# Patient Record
Sex: FEMALE | Race: WHITE | NOT HISPANIC OR LATINO
[De-identification: names, ages, dates, MRNs, and addresses within clinical notes are randomized per-mention and may not be internally consistent; named-entity substitution may affect disease eponyms.]

---

## 2017-11-07 RX ORDER — CHOLECALCIFEROL (VITAMIN D3) 125 MCG
2 CAPSULE ORAL
Qty: 0 | Refills: 0 | COMMUNITY

## 2017-11-07 RX ORDER — ESOMEPRAZOLE MAGNESIUM 40 MG/1
1 CAPSULE, DELAYED RELEASE ORAL
Qty: 0 | Refills: 0 | COMMUNITY

## 2017-11-07 NOTE — PATIENT PROFILE ADULT. - PSH
H/O discectomy  lumbar 9/2012  H/O laminectomy    H/O pilonidal cyst  removed  History of hip replacement, total, right  2014  History of tonsillectomy

## 2017-11-07 NOTE — PATIENT PROFILE ADULT. - PMH
Asthma    Diverticulitis    GERD (gastroesophageal reflux disease)    HLD (hyperlipidemia)    HTN (hypertension)    OA (osteoarthritis)    Osteoporosis

## 2017-11-08 ENCOUNTER — TRANSCRIPTION ENCOUNTER (OUTPATIENT)
Age: 72
End: 2017-11-08

## 2017-11-08 ENCOUNTER — RESULT REVIEW (OUTPATIENT)
Age: 72
End: 2017-11-08

## 2017-11-08 ENCOUNTER — INPATIENT (INPATIENT)
Facility: HOSPITAL | Age: 72
LOS: 1 days | Discharge: HOME HEALTH | DRG: 470 | End: 2017-11-10
Payer: MEDICARE

## 2017-11-08 VITALS
OXYGEN SATURATION: 98 % | SYSTOLIC BLOOD PRESSURE: 186 MMHG | WEIGHT: 175.05 LBS | HEIGHT: 66 IN | DIASTOLIC BLOOD PRESSURE: 74 MMHG | TEMPERATURE: 98 F

## 2017-11-08 DIAGNOSIS — Z98.890 OTHER SPECIFIED POSTPROCEDURAL STATES: Chronic | ICD-10-CM

## 2017-11-08 DIAGNOSIS — M19.90 UNSPECIFIED OSTEOARTHRITIS, UNSPECIFIED SITE: ICD-10-CM

## 2017-11-08 DIAGNOSIS — Z90.89 ACQUIRED ABSENCE OF OTHER ORGANS: Chronic | ICD-10-CM

## 2017-11-08 DIAGNOSIS — Z87.2 PERSONAL HISTORY OF DISEASES OF THE SKIN AND SUBCUTANEOUS TISSUE: Chronic | ICD-10-CM

## 2017-11-08 DIAGNOSIS — Z96.641 PRESENCE OF RIGHT ARTIFICIAL HIP JOINT: Chronic | ICD-10-CM

## 2017-11-08 LAB
MRSA PCR RESULT.: NEGATIVE — SIGNIFICANT CHANGE UP
S AUREUS DNA NOSE QL NAA+PROBE: NEGATIVE — SIGNIFICANT CHANGE UP

## 2017-11-08 RX ORDER — ACETAMINOPHEN 500 MG
975 TABLET ORAL EVERY 8 HOURS
Qty: 0 | Refills: 0 | Status: DISCONTINUED | OUTPATIENT
Start: 2017-11-08 | End: 2017-11-10

## 2017-11-08 RX ORDER — ATORVASTATIN CALCIUM 80 MG/1
20 TABLET, FILM COATED ORAL AT BEDTIME
Qty: 0 | Refills: 0 | Status: DISCONTINUED | OUTPATIENT
Start: 2017-11-08 | End: 2017-11-10

## 2017-11-08 RX ORDER — PANTOPRAZOLE SODIUM 20 MG/1
40 TABLET, DELAYED RELEASE ORAL DAILY
Qty: 0 | Refills: 0 | Status: DISCONTINUED | OUTPATIENT
Start: 2017-11-08 | End: 2017-11-10

## 2017-11-08 RX ORDER — SODIUM CHLORIDE 9 MG/ML
1000 INJECTION, SOLUTION INTRAVENOUS
Qty: 0 | Refills: 0 | Status: DISCONTINUED | OUTPATIENT
Start: 2017-11-08 | End: 2017-11-09

## 2017-11-08 RX ORDER — HYDROCHLOROTHIAZIDE 25 MG
25 TABLET ORAL DAILY
Qty: 0 | Refills: 0 | Status: DISCONTINUED | OUTPATIENT
Start: 2017-11-08 | End: 2017-11-10

## 2017-11-08 RX ORDER — ACETAMINOPHEN 500 MG
650 TABLET ORAL EVERY 6 HOURS
Qty: 0 | Refills: 0 | Status: DISCONTINUED | OUTPATIENT
Start: 2017-11-08 | End: 2017-11-10

## 2017-11-08 RX ORDER — BUPIVACAINE 13.3 MG/ML
20 INJECTION, SUSPENSION, LIPOSOMAL INFILTRATION ONCE
Qty: 0 | Refills: 0 | Status: DISCONTINUED | OUTPATIENT
Start: 2017-11-08 | End: 2017-11-10

## 2017-11-08 RX ORDER — ZALEPLON 10 MG
5 CAPSULE ORAL AT BEDTIME
Qty: 0 | Refills: 0 | Status: DISCONTINUED | OUTPATIENT
Start: 2017-11-08 | End: 2017-11-10

## 2017-11-08 RX ORDER — MAGNESIUM HYDROXIDE 400 MG/1
30 TABLET, CHEWABLE ORAL
Qty: 0 | Refills: 0 | Status: DISCONTINUED | OUTPATIENT
Start: 2017-11-08 | End: 2017-11-10

## 2017-11-08 RX ORDER — CEFAZOLIN SODIUM 1 G
2000 VIAL (EA) INJECTION EVERY 8 HOURS
Qty: 0 | Refills: 0 | Status: COMPLETED | OUTPATIENT
Start: 2017-11-08 | End: 2017-11-09

## 2017-11-08 RX ORDER — ONDANSETRON 8 MG/1
4 TABLET, FILM COATED ORAL EVERY 6 HOURS
Qty: 0 | Refills: 0 | Status: DISCONTINUED | OUTPATIENT
Start: 2017-11-08 | End: 2017-11-10

## 2017-11-08 RX ORDER — DOCUSATE SODIUM 100 MG
100 CAPSULE ORAL THREE TIMES A DAY
Qty: 0 | Refills: 0 | Status: DISCONTINUED | OUTPATIENT
Start: 2017-11-08 | End: 2017-11-10

## 2017-11-08 RX ORDER — FAMOTIDINE 10 MG/ML
20 INJECTION INTRAVENOUS EVERY 12 HOURS
Qty: 0 | Refills: 0 | Status: DISCONTINUED | OUTPATIENT
Start: 2017-11-08 | End: 2017-11-10

## 2017-11-08 RX ORDER — POLYETHYLENE GLYCOL 3350 17 G/17G
17 POWDER, FOR SOLUTION ORAL DAILY
Qty: 0 | Refills: 0 | Status: DISCONTINUED | OUTPATIENT
Start: 2017-11-08 | End: 2017-11-10

## 2017-11-08 RX ORDER — MORPHINE SULFATE 50 MG/1
2 CAPSULE, EXTENDED RELEASE ORAL
Qty: 0 | Refills: 0 | Status: DISCONTINUED | OUTPATIENT
Start: 2017-11-08 | End: 2017-11-10

## 2017-11-08 RX ORDER — ASPIRIN/CALCIUM CARB/MAGNESIUM 324 MG
325 TABLET ORAL
Qty: 0 | Refills: 0 | Status: DISCONTINUED | OUTPATIENT
Start: 2017-11-08 | End: 2017-11-10

## 2017-11-08 RX ORDER — SENNA PLUS 8.6 MG/1
2 TABLET ORAL AT BEDTIME
Qty: 0 | Refills: 0 | Status: DISCONTINUED | OUTPATIENT
Start: 2017-11-08 | End: 2017-11-10

## 2017-11-08 RX ORDER — OXYCODONE HYDROCHLORIDE 5 MG/1
5 TABLET ORAL EVERY 4 HOURS
Qty: 0 | Refills: 0 | Status: DISCONTINUED | OUTPATIENT
Start: 2017-11-08 | End: 2017-11-10

## 2017-11-08 RX ORDER — HYDROMORPHONE HYDROCHLORIDE 2 MG/ML
0.5 INJECTION INTRAMUSCULAR; INTRAVENOUS; SUBCUTANEOUS
Qty: 0 | Refills: 0 | Status: DISCONTINUED | OUTPATIENT
Start: 2017-11-08 | End: 2017-11-10

## 2017-11-08 RX ORDER — OXYCODONE HYDROCHLORIDE 5 MG/1
10 TABLET ORAL EVERY 4 HOURS
Qty: 0 | Refills: 0 | Status: DISCONTINUED | OUTPATIENT
Start: 2017-11-08 | End: 2017-11-10

## 2017-11-08 RX ADMIN — Medication 100 MILLIGRAM(S): at 21:09

## 2017-11-08 RX ADMIN — Medication 100 MILLIGRAM(S): at 21:48

## 2017-11-08 RX ADMIN — Medication 975 MILLIGRAM(S): at 22:46

## 2017-11-08 RX ADMIN — ATORVASTATIN CALCIUM 20 MILLIGRAM(S): 80 TABLET, FILM COATED ORAL at 21:46

## 2017-11-08 RX ADMIN — Medication 975 MILLIGRAM(S): at 21:46

## 2017-11-08 RX ADMIN — SENNA PLUS 2 TABLET(S): 8.6 TABLET ORAL at 21:48

## 2017-11-08 NOTE — PHYSICAL THERAPY INITIAL EVALUATION ADULT - PERTINENT HX OF CURRENT PROBLEM, REHAB EVAL
Patient is a 72F c/o Left hip pain worsened over time without improvement. States fell in driveway 1 week ago causing Left hip fracture. Denies numbness, tingling, head trauma, LOC, other injuries. Ambulating with crutches. Presents today for elective Left THR with Dr. Murillo

## 2017-11-08 NOTE — DISCHARGE NOTE ADULT - PATIENT PORTAL LINK FT
“You can access the [employer] Patient Portal, offered by [facility], by registering with the following website: [email or web address]”

## 2017-11-08 NOTE — H&P ADULT - NSHPLABSRESULTS_GEN_ALL_CORE
Preop cbc, bmp (K 3.3), pt/inr, ptt wnl; nasal swab dos  +UA (asymptomatic) repeat dos  preop cxr wnl per clearance  preop ekg sinus vincent per clearance

## 2017-11-08 NOTE — DISCHARGE NOTE ADULT - MEDICATION SUMMARY - MEDICATIONS TO TAKE
I will START or STAY ON the medications listed below when I get home from the hospital:    aspirin 325 mg oral delayed release tablet  -- 1 tab(s) by mouth 2 times a day  -- Indication: For Dvt ppx    oxyCODONE 5 mg oral tablet  -- 1 tab(s) by mouth every 4 hours, As needed, Moderate Pain (4 - 6)  -- Indication: For pain med    oxyCODONE 10 mg oral tablet  -- 1 tab(s) by mouth every 4 hours, As needed, Severe Pain (7 - 10)  -- Indication: For pain med    Lipitor 20 mg oral tablet  -- 1 tab(s) by mouth once a day  -- Indication: For HLD (hyperlipidemia)    hydroCHLOROthiazide 25 mg oral tablet  -- 1 tab(s) by mouth once a day  -- Indication: For HTN (hypertension)    senna oral tablet  -- 2 tab(s) by mouth once a day (at bedtime), As needed, Constipation  -- Indication: For laxative    docusate sodium 100 mg oral capsule  -- 1 cap(s) by mouth 3 times a day  -- Indication: For laxative    NexIUM OTC 20 mg oral delayed release capsule  -- 1 cap(s) by mouth once a day  -- Indication: For GERD (gastroesophageal reflux disease) I will START or STAY ON the medications listed below when I get home from the hospital:    aspirin 325 mg oral delayed release tablet  -- 1 tab(s) by mouth 2 times a day  Take for 4 weeks after surgery to prevent blood clots.  OVER THE COUNTER MEDICINE  -- Indication: For DVT Prophylaxis     oxyCODONE-acetaminophen 5 mg-325 mg oral tablet  -- 1-2 tab(s) by mouth every 6 hours, As Needed -for severe pain MDD:10  -- Caution federal law prohibits the transfer of this drug to any person other  than the person for whom it was prescribed.  May cause drowsiness.  Alcohol may intensify this effect.  Use care when operating dangerous machinery.  This prescription cannot be refilled.  This product contains acetaminophen.  Do not use  with any other product containing acetaminophen to prevent possible liver damage.  Using more of this medication than prescribed may cause serious breathing problems.    -- Indication: For Severe Pain    meloxicam 15 mg oral tablet  -- 1 tab(s) by mouth once a day   -- Do not take this drug if you are pregnant.  Obtain medical advice before taking any non-prescription drugs as some may affect the action of this medication.  Take with food or milk.    -- Indication: For Anti inflammatory     Lipitor 20 mg oral tablet  -- 1 tab(s) by mouth once a day  -- Indication: For HLD (hyperlipidemia)    hydroCHLOROthiazide 25 mg oral tablet  -- 1 tab(s) by mouth once a day  -- Indication: For HTN (hypertension)    senna oral tablet  -- 2 tab(s) by mouth once a day (at bedtime), As needed, Constipation  -- Indication: For laxative    docusate sodium 100 mg oral capsule  -- 1 cap(s) by mouth 3 times a day  -- Indication: For laxative    bisacodyl 10 mg rectal suppository  -- 1 suppository(ies) rectally once a day, As needed, If no bowel movement by POD#2  -- Indication: For suppository    polyethylene glycol 3350 oral powder for reconstitution  -- 17 gram(s) by mouth once a day  -- Indication: For laxative    NexIUM OTC 20 mg oral delayed release capsule  -- 1 cap(s) by mouth once a day  -- Indication: For GERD (gastroesophageal reflux disease)    calcium (as carbonate)-vitamin D 250 mg-125 intl units oral tablet  -- 1 tab(s) by mouth once a day  -- Indication: For Vitamin

## 2017-11-08 NOTE — DISCHARGE NOTE ADULT - NS AS ACTIVITY OBS
Walking-Indoors allowed/Do not make important decisions/No Heavy lifting/straining/Walking-Outdoors allowed/Stairs allowed/Do not drive or operate machinery/Showering allowed

## 2017-11-08 NOTE — PHYSICAL THERAPY INITIAL EVALUATION ADULT - GENERAL OBSERVATIONS, REHAB EVAL
Received semi-supine in bed with +tele, +pulse ox, on 2LPM O2 via NC, +SCDs, dressing c/d/i, +ice pack to L hip x 2, in no apparent distress. Patient denies pain at rest

## 2017-11-08 NOTE — DISCHARGE NOTE ADULT - CARE PROVIDER_API CALL
Saw Murillo), Orthopaedic Surgery  [address]  [address] [place] [address]  Phone: [phone]  Fax: [phone]

## 2017-11-08 NOTE — PHYSICAL THERAPY INITIAL EVALUATION ADULT - CRITERIA FOR SKILLED THERAPEUTIC INTERVENTIONS
risk reduction/prevention/impairments found/functional limitations in following categories/predicted duration of therapy intervention/rehab potential/anticipated equipment needs at discharge/therapy frequency/anticipated discharge recommendation

## 2017-11-08 NOTE — H&P ADULT - HISTORY OF PRESENT ILLNESS
72F c/o Left hip pain worsened over time without improvement. States fell in driveway 1 week ago causing Left hip fracture. Denies numbness, tingling, head trauma, LOC, other injuries. Ambulating with crutches. Presents today for elective Left THR.

## 2017-11-08 NOTE — DISCHARGE NOTE ADULT - HOSPITAL COURSE
Admitted  Surgery  Ramonita-op Antibiotics  Pain control  DVT prophylaxis  OOB/Physical Therapy Admitted 11/8/17  Surgery 11/8/17- L LUCIANA  Ramonita-op Antibiotics  Pain control  DVT prophylaxis  OOB/Physical Therapy

## 2017-11-08 NOTE — PHYSICAL THERAPY INITIAL EVALUATION ADULT - ADDITIONAL COMMENTS
Patient lives with her  in a private house with 4 steps with unilateral handrail to enter. Prior to admission, patient was ambulating with crutches s/p fall in driveway and needed assistance from her  for ADLs.

## 2017-11-08 NOTE — DISCHARGE NOTE ADULT - ADDITIONAL INSTRUCTIONS
No strenuous activity, heavy lifting, driving or returning to work until cleared by MD.  You may shower - dressing is water-resistant, no soaking in bathtubs.  Remove dressing after post op day 5-7, then leave incision open to air. Keep incision clean and dry.  Try to have regular bowel movements, take stool softener or laxative if necessary.  May take Pepcid or Zantac for upset stomach.  May take Aleve or Naproxen instead of Meloxicam/Celebrex.  Swelling may travel all the way down leg to foot, this is normal and will subside in a few weeks.  Call to schedule an appt with Dr. Murillo for follow up, if you have staples or sutures they will be removed in office.  Contact your doctor if you experience: fever greater than 101.5, chills, chest pain, difficulty breathing, redness or excessive drainage around the incision, other concerns.  Follow up with your primary care provider. Weight bearing as tolerated on left leg with rolling walker.  No hip precautions.  No strenuous activity, heavy lifting, driving or returning to work until cleared by MD.  You may shower - dressing is water-resistant, no soaking in bathtubs.  Remove dressing after post op day 7, then leave incision open to air. Keep incision clean and dry.  Try to have regular bowel movements, take stool softener or laxative if necessary.  May take Pepcid or Zantac for upset stomach.  May take Aleve or Naproxen instead of Meloxicam/Celebrex.  Swelling may travel all the way down leg to foot, this is normal and will subside in a few weeks.  Call to schedule an appt with Dr. Murillo for follow up, if you have staples or sutures they will be removed in office.  Contact your doctor if you experience: fever greater than 101.5, chills, chest pain, difficulty breathing, redness or excessive drainage around the incision, other concerns.  Follow up with your primary care provider.

## 2017-11-08 NOTE — PHYSICAL THERAPY INITIAL EVALUATION ADULT - DIAGNOSIS, PT EVAL
Practice Pattern 4H: Impaired joint mobility, motor function, muscle performance and range of motion associated with joint arthroplasty

## 2017-11-08 NOTE — DISCHARGE NOTE ADULT - CARE PLAN
Principal Discharge DX:	OA (osteoarthritis)  Goal:	improvements s/p Left THR  Instructions for follow-up, activity and diet:	see below Principal Discharge DX:	OA (osteoarthritis)  Goal:	improvements s/p Left LUCIANA  Instructions for follow-up, activity and diet:	see below

## 2017-11-08 NOTE — PHYSICAL THERAPY INITIAL EVALUATION ADULT - IMPAIRMENTS FOUND, PT EVAL
gait, locomotion, and balance/joint integrity and mobility/muscle strength/aerobic capacity/endurance

## 2017-11-08 NOTE — PHYSICAL THERAPY INITIAL EVALUATION ADULT - PLANNED THERAPY INTERVENTIONS, PT EVAL
gait training/manual therapy techniques/bed mobility training/balance training/ROM/strengthening/transfer training

## 2017-11-08 NOTE — DISCHARGE NOTE ADULT - MEDICATION SUMMARY - MEDICATIONS TO STOP TAKING
I will STOP taking the medications listed below when I get home from the hospital:    Percocet 5/325 oral tablet  -- 1 tab(s) by mouth every 6 hours, As Needed I will STOP taking the medications listed below when I get home from the hospital:  None

## 2017-11-09 LAB
ANION GAP SERPL CALC-SCNC: 12 MMOL/L — SIGNIFICANT CHANGE UP (ref 5–17)
BUN SERPL-MCNC: 23 MG/DL — SIGNIFICANT CHANGE UP (ref 7–23)
CALCIUM SERPL-MCNC: 9.4 MG/DL — SIGNIFICANT CHANGE UP (ref 8.4–10.5)
CHLORIDE SERPL-SCNC: 94 MMOL/L — LOW (ref 96–108)
CO2 SERPL-SCNC: 30 MMOL/L — SIGNIFICANT CHANGE UP (ref 22–31)
CREAT SERPL-MCNC: 0.83 MG/DL — SIGNIFICANT CHANGE UP (ref 0.5–1.3)
GLUCOSE SERPL-MCNC: 116 MG/DL — HIGH (ref 70–99)
HCT VFR BLD CALC: 33.3 % — LOW (ref 34.5–45)
HGB BLD-MCNC: 10.8 G/DL — LOW (ref 11.5–15.5)
MCHC RBC-ENTMCNC: 29.1 PG — SIGNIFICANT CHANGE UP (ref 27–34)
MCHC RBC-ENTMCNC: 32.4 G/DL — SIGNIFICANT CHANGE UP (ref 32–36)
MCV RBC AUTO: 89.8 FL — SIGNIFICANT CHANGE UP (ref 80–100)
PLATELET # BLD AUTO: 332 K/UL — SIGNIFICANT CHANGE UP (ref 150–400)
POTASSIUM SERPL-MCNC: 3.6 MMOL/L — SIGNIFICANT CHANGE UP (ref 3.5–5.3)
POTASSIUM SERPL-SCNC: 3.6 MMOL/L — SIGNIFICANT CHANGE UP (ref 3.5–5.3)
RBC # BLD: 3.71 M/UL — LOW (ref 3.8–5.2)
RBC # FLD: 14.2 % — SIGNIFICANT CHANGE UP (ref 10.3–16.9)
SODIUM SERPL-SCNC: 136 MMOL/L — SIGNIFICANT CHANGE UP (ref 135–145)
WBC # BLD: 10.9 K/UL — HIGH (ref 3.8–10.5)
WBC # FLD AUTO: 10.9 K/UL — HIGH (ref 3.8–10.5)

## 2017-11-09 RX ORDER — OXYCODONE HYDROCHLORIDE 5 MG/1
1 TABLET ORAL
Qty: 0 | Refills: 0 | COMMUNITY
Start: 2017-11-09

## 2017-11-09 RX ORDER — POLYETHYLENE GLYCOL 3350 17 G/17G
17 POWDER, FOR SOLUTION ORAL
Qty: 0 | Refills: 0 | COMMUNITY
Start: 2017-11-09

## 2017-11-09 RX ORDER — DOCUSATE SODIUM 100 MG
1 CAPSULE ORAL
Qty: 0 | Refills: 0 | COMMUNITY
Start: 2017-11-09

## 2017-11-09 RX ORDER — ASPIRIN/CALCIUM CARB/MAGNESIUM 324 MG
1 TABLET ORAL
Qty: 0 | Refills: 0 | COMMUNITY
Start: 2017-11-09

## 2017-11-09 RX ORDER — KETOROLAC TROMETHAMINE 30 MG/ML
15 SYRINGE (ML) INJECTION EVERY 6 HOURS
Qty: 0 | Refills: 0 | Status: COMPLETED | OUTPATIENT
Start: 2017-11-09 | End: 2017-11-11

## 2017-11-09 RX ORDER — SENNA PLUS 8.6 MG/1
2 TABLET ORAL
Qty: 0 | Refills: 0 | COMMUNITY
Start: 2017-11-09

## 2017-11-09 RX ADMIN — OXYCODONE HYDROCHLORIDE 5 MILLIGRAM(S): 5 TABLET ORAL at 08:31

## 2017-11-09 RX ADMIN — Medication 325 MILLIGRAM(S): at 05:02

## 2017-11-09 RX ADMIN — Medication 325 MILLIGRAM(S): at 17:28

## 2017-11-09 RX ADMIN — Medication 975 MILLIGRAM(S): at 22:36

## 2017-11-09 RX ADMIN — Medication 100 MILLIGRAM(S): at 22:35

## 2017-11-09 RX ADMIN — OXYCODONE HYDROCHLORIDE 10 MILLIGRAM(S): 5 TABLET ORAL at 11:57

## 2017-11-09 RX ADMIN — ONDANSETRON 4 MILLIGRAM(S): 8 TABLET, FILM COATED ORAL at 14:32

## 2017-11-09 RX ADMIN — Medication 975 MILLIGRAM(S): at 05:00

## 2017-11-09 RX ADMIN — Medication 975 MILLIGRAM(S): at 06:00

## 2017-11-09 RX ADMIN — Medication 100 MILLIGRAM(S): at 05:01

## 2017-11-09 RX ADMIN — Medication 25 MILLIGRAM(S): at 05:02

## 2017-11-09 RX ADMIN — Medication 15 MILLIGRAM(S): at 16:15

## 2017-11-09 RX ADMIN — Medication 100 MILLIGRAM(S): at 05:06

## 2017-11-09 RX ADMIN — Medication 975 MILLIGRAM(S): at 13:28

## 2017-11-09 RX ADMIN — Medication 1 TABLET(S): at 11:56

## 2017-11-09 RX ADMIN — FAMOTIDINE 20 MILLIGRAM(S): 10 INJECTION INTRAVENOUS at 05:02

## 2017-11-09 RX ADMIN — POLYETHYLENE GLYCOL 3350 17 GRAM(S): 17 POWDER, FOR SOLUTION ORAL at 13:28

## 2017-11-09 RX ADMIN — Medication 100 MILLIGRAM(S): at 13:28

## 2017-11-09 RX ADMIN — Medication 15 MILLIGRAM(S): at 16:31

## 2017-11-09 RX ADMIN — OXYCODONE HYDROCHLORIDE 10 MILLIGRAM(S): 5 TABLET ORAL at 12:57

## 2017-11-09 RX ADMIN — PANTOPRAZOLE SODIUM 40 MILLIGRAM(S): 20 TABLET, DELAYED RELEASE ORAL at 07:11

## 2017-11-09 RX ADMIN — Medication 975 MILLIGRAM(S): at 23:25

## 2017-11-09 RX ADMIN — FAMOTIDINE 20 MILLIGRAM(S): 10 INJECTION INTRAVENOUS at 17:28

## 2017-11-09 RX ADMIN — OXYCODONE HYDROCHLORIDE 5 MILLIGRAM(S): 5 TABLET ORAL at 07:58

## 2017-11-09 RX ADMIN — ATORVASTATIN CALCIUM 20 MILLIGRAM(S): 80 TABLET, FILM COATED ORAL at 22:34

## 2017-11-09 RX ADMIN — Medication 975 MILLIGRAM(S): at 14:23

## 2017-11-09 NOTE — PROVIDER CONTACT NOTE (OTHER) - BACKGROUND
S/P THR POD 1. 2nd PT trail. Pt. recently medicated with OXy- pt took this before and tolerates it fine.
Pt had a left total hip replacement

## 2017-11-10 VITALS
HEART RATE: 56 BPM | DIASTOLIC BLOOD PRESSURE: 60 MMHG | OXYGEN SATURATION: 97 % | RESPIRATION RATE: 15 BRPM | TEMPERATURE: 97 F | SYSTOLIC BLOOD PRESSURE: 137 MMHG

## 2017-11-10 LAB
ANION GAP SERPL CALC-SCNC: 12 MMOL/L — SIGNIFICANT CHANGE UP (ref 5–17)
BUN SERPL-MCNC: 22 MG/DL — SIGNIFICANT CHANGE UP (ref 7–23)
CALCIUM SERPL-MCNC: 9.5 MG/DL — SIGNIFICANT CHANGE UP (ref 8.4–10.5)
CHLORIDE SERPL-SCNC: 91 MMOL/L — LOW (ref 96–108)
CO2 SERPL-SCNC: 29 MMOL/L — SIGNIFICANT CHANGE UP (ref 22–31)
CREAT SERPL-MCNC: 0.96 MG/DL — SIGNIFICANT CHANGE UP (ref 0.5–1.3)
GLUCOSE SERPL-MCNC: 103 MG/DL — HIGH (ref 70–99)
HCT VFR BLD CALC: 29.5 % — LOW (ref 34.5–45)
HGB BLD-MCNC: 10 G/DL — LOW (ref 11.5–15.5)
MCHC RBC-ENTMCNC: 30.3 PG — SIGNIFICANT CHANGE UP (ref 27–34)
MCHC RBC-ENTMCNC: 33.9 G/DL — SIGNIFICANT CHANGE UP (ref 32–36)
MCV RBC AUTO: 89.4 FL — SIGNIFICANT CHANGE UP (ref 80–100)
PLATELET # BLD AUTO: 289 K/UL — SIGNIFICANT CHANGE UP (ref 150–400)
POTASSIUM SERPL-MCNC: 3.1 MMOL/L — LOW (ref 3.5–5.3)
POTASSIUM SERPL-SCNC: 3.1 MMOL/L — LOW (ref 3.5–5.3)
RBC # BLD: 3.3 M/UL — LOW (ref 3.8–5.2)
RBC # FLD: 14.8 % — SIGNIFICANT CHANGE UP (ref 10.3–16.9)
SODIUM SERPL-SCNC: 132 MMOL/L — LOW (ref 135–145)
SURGICAL PATHOLOGY STUDY: SIGNIFICANT CHANGE UP
WBC # BLD: 9.1 K/UL — SIGNIFICANT CHANGE UP (ref 3.8–10.5)
WBC # FLD AUTO: 9.1 K/UL — SIGNIFICANT CHANGE UP (ref 3.8–10.5)

## 2017-11-10 RX ORDER — MELOXICAM 15 MG/1
1 TABLET ORAL
Qty: 30 | Refills: 0 | OUTPATIENT
Start: 2017-11-10 | End: 2017-12-10

## 2017-11-10 RX ORDER — POTASSIUM CHLORIDE 20 MEQ
40 PACKET (EA) ORAL ONCE
Qty: 0 | Refills: 0 | Status: COMPLETED | OUTPATIENT
Start: 2017-11-10 | End: 2017-11-10

## 2017-11-10 RX ADMIN — Medication 15 MILLIGRAM(S): at 00:50

## 2017-11-10 RX ADMIN — POLYETHYLENE GLYCOL 3350 17 GRAM(S): 17 POWDER, FOR SOLUTION ORAL at 12:10

## 2017-11-10 RX ADMIN — Medication 975 MILLIGRAM(S): at 07:10

## 2017-11-10 RX ADMIN — Medication 40 MILLIEQUIVALENT(S): at 12:10

## 2017-11-10 RX ADMIN — Medication 325 MILLIGRAM(S): at 16:43

## 2017-11-10 RX ADMIN — FAMOTIDINE 20 MILLIGRAM(S): 10 INJECTION INTRAVENOUS at 06:15

## 2017-11-10 RX ADMIN — Medication 15 MILLIGRAM(S): at 00:26

## 2017-11-10 RX ADMIN — Medication 15 MILLIGRAM(S): at 07:10

## 2017-11-10 RX ADMIN — Medication 975 MILLIGRAM(S): at 13:45

## 2017-11-10 RX ADMIN — Medication 25 MILLIGRAM(S): at 06:15

## 2017-11-10 RX ADMIN — Medication 15 MILLIGRAM(S): at 06:13

## 2017-11-10 RX ADMIN — Medication 15 MILLIGRAM(S): at 16:43

## 2017-11-10 RX ADMIN — Medication 100 MILLIGRAM(S): at 06:14

## 2017-11-10 RX ADMIN — Medication 100 MILLIGRAM(S): at 12:10

## 2017-11-10 RX ADMIN — Medication 325 MILLIGRAM(S): at 06:14

## 2017-11-10 RX ADMIN — Medication 975 MILLIGRAM(S): at 06:14

## 2017-11-10 RX ADMIN — PANTOPRAZOLE SODIUM 40 MILLIGRAM(S): 20 TABLET, DELAYED RELEASE ORAL at 06:15

## 2017-11-10 RX ADMIN — Medication 975 MILLIGRAM(S): at 14:45

## 2017-11-10 RX ADMIN — Medication 15 MILLIGRAM(S): at 12:25

## 2017-11-10 RX ADMIN — Medication 15 MILLIGRAM(S): at 12:10

## 2017-11-10 NOTE — PROGRESS NOTE ADULT - SUBJECTIVE AND OBJECTIVE BOX
Ortho Addendum:    Patient seen bedside. Pain well controlled. Wants to be discharged home today. States did well with PT. No complaints.  No CP, palpitations, or SOB  vss  LEFT LE: Dressing c/d/i  thigh/calf soft,,compressible  ehl, tib ant, GS 5/5  gross sensation intact LE  foot warm, DP palpable    K= 3.1    A/P: s/p Left LUCIANA POD#2  Plan for discharge home today  Slight hypokalemia, K+ repleted with K-Leigh 40 mEq
Did well o/n.     Procedure: L LUCIANA ant  Surgeon: Dr Murillo    Pt comfortable, without complaints  Denies CP, SOB, N/V, numbness/tingling     Vital Signs Last 24 Hrs  T(C): 36.7 (10 Nov 2017 05:25), Max: 37.2 (09 Nov 2017 08:35)  T(F): 98 (10 Nov 2017 05:25), Max: 99 (09 Nov 2017 08:35)  HR: 67 (10 Nov 2017 05:25) (56 - 69)  BP: 112/70 (10 Nov 2017 05:25) (112/70 - 168/77)  BP(mean): --  RR: 14 (10 Nov 2017 05:25) (14 - 16)  SpO2: 97% (10 Nov 2017 01:20) (96% - 97%)    Dressing C/D/I  General: Pt Alert and oriented   wwp  Sensation: silt  Motor: 5/5 GS/TA/EHL/FHL      A/P: 72yFemale POD#2 s/p above  - Stable  - Pain Control  - DVT ppx  - Post op abx  - PT, WBS: wbat  - F/U AM Labs
Orthopaedics Post Op Check    Procedure: L LUCIANA ant  Surgeon: Dr Murillo    Pt comfortable, without complaints  Denies CP, SOB, N/V, numbness/tingling     Vital Signs Last 24 Hrs  T(C): 36.7 (09 Nov 2017 04:52), Max: 37.1 (08 Nov 2017 20:30)  T(F): 98.1 (09 Nov 2017 04:52), Max: 98.8 (08 Nov 2017 20:30)  HR: 55 (09 Nov 2017 04:52) (36 - 74)  BP: 123/75 (09 Nov 2017 04:52) (112/72 - 186/74)  BP(mean): 100 (08 Nov 2017 21:00) (84 - 107)  RR: 16 (09 Nov 2017 04:52) (6 - 21)  SpO2: 96% (09 Nov 2017 04:52) (87% - 100%)  AVSS, NAD    Dressing C/D/I  General: Pt Alert and oriented   wwp  Sensation: silt  Motor: 5/5 GS/TA/EHL/FHL      A/P: 72yFemale POD#0 s/p above  - Stable  - Pain Control  - DVT ppx  - Post op abx  - PT, WBS: wbat  - F/U AM Labs

## 2017-11-17 DIAGNOSIS — R73.03 PREDIABETES: ICD-10-CM
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
## 2017-11-17 DIAGNOSIS — Z79.899 OTHER LONG TERM (CURRENT) DRUG THERAPY: ICD-10-CM

## 2017-11-17 DIAGNOSIS — K21.9 GASTRO-ESOPHAGEAL REFLUX DISEASE WITHOUT ESOPHAGITIS: ICD-10-CM

## 2017-11-17 DIAGNOSIS — J45.20 MILD INTERMITTENT ASTHMA, UNCOMPLICATED: ICD-10-CM

## 2017-11-17 DIAGNOSIS — S72.002A FRACTURE OF UNSPECIFIED PART OF NECK OF LEFT FEMUR, INITIAL ENCOUNTER FOR CLOSED FRACTURE: ICD-10-CM

## 2017-11-17 DIAGNOSIS — Z96.641 PRESENCE OF RIGHT ARTIFICIAL HIP JOINT: ICD-10-CM

## 2017-11-17 DIAGNOSIS — Z91.09 OTHER ALLERGY STATUS, OTHER THAN TO DRUGS AND BIOLOGICAL SUBSTANCES: ICD-10-CM

## 2017-11-17 DIAGNOSIS — I10 ESSENTIAL (PRIMARY) HYPERTENSION: ICD-10-CM

## 2017-11-17 DIAGNOSIS — Z28.21 IMMUNIZATION NOT CARRIED OUT BECAUSE OF PATIENT REFUSAL: ICD-10-CM
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
## 2017-11-17 DIAGNOSIS — Z88.8 ALLERGY STATUS TO OTHER DRUGS, MEDICAMENTS AND BIOLOGICAL SUBSTANCES: ICD-10-CM

## 2017-11-17 DIAGNOSIS — M16.12 UNILATERAL PRIMARY OSTEOARTHRITIS, LEFT HIP: ICD-10-CM

## 2017-11-17 DIAGNOSIS — E87.6 HYPOKALEMIA: ICD-10-CM

## 2017-11-17 DIAGNOSIS — E78.5 HYPERLIPIDEMIA, UNSPECIFIED: ICD-10-CM

## 2017-11-17 DIAGNOSIS — Z79.891 LONG TERM (CURRENT) USE OF OPIATE ANALGESIC: ICD-10-CM

## 2017-11-17 DIAGNOSIS — Z87.891 PERSONAL HISTORY OF NICOTINE DEPENDENCE: ICD-10-CM

## 2017-11-17 DIAGNOSIS — Z86.010 PERSONAL HISTORY OF COLONIC POLYPS: ICD-10-CM

## 2017-11-17 DIAGNOSIS — E21.3 HYPERPARATHYROIDISM, UNSPECIFIED: ICD-10-CM

## 2017-11-17 DIAGNOSIS — M81.0 AGE-RELATED OSTEOPOROSIS WITHOUT CURRENT PATHOLOGICAL FRACTURE: ICD-10-CM

## 2017-11-17 DIAGNOSIS — G47.00 INSOMNIA, UNSPECIFIED: ICD-10-CM

## 2017-12-19 ENCOUNTER — INPATIENT (INPATIENT)
Facility: HOSPITAL | Age: 72
LOS: 2 days | Discharge: HOME HEALTH | DRG: 467 | End: 2017-12-22
Attending: ORTHOPAEDIC SURGERY | Admitting: ORTHOPAEDIC SURGERY
Payer: MEDICARE

## 2017-12-19 VITALS
SYSTOLIC BLOOD PRESSURE: 164 MMHG | HEART RATE: 69 BPM | OXYGEN SATURATION: 98 % | TEMPERATURE: 98 F | RESPIRATION RATE: 18 BRPM | DIASTOLIC BLOOD PRESSURE: 86 MMHG

## 2017-12-19 DIAGNOSIS — Z98.890 OTHER SPECIFIED POSTPROCEDURAL STATES: Chronic | ICD-10-CM

## 2017-12-19 DIAGNOSIS — Z87.2 PERSONAL HISTORY OF DISEASES OF THE SKIN AND SUBCUTANEOUS TISSUE: Chronic | ICD-10-CM

## 2017-12-19 DIAGNOSIS — Z96.641 PRESENCE OF RIGHT ARTIFICIAL HIP JOINT: Chronic | ICD-10-CM

## 2017-12-19 DIAGNOSIS — Z90.89 ACQUIRED ABSENCE OF OTHER ORGANS: Chronic | ICD-10-CM

## 2017-12-19 LAB
ALBUMIN SERPL ELPH-MCNC: 4.3 G/DL — SIGNIFICANT CHANGE UP (ref 3.3–5)
ALP SERPL-CCNC: 114 U/L — SIGNIFICANT CHANGE UP (ref 40–120)
ALT FLD-CCNC: 11 U/L — SIGNIFICANT CHANGE UP (ref 10–45)
ANION GAP SERPL CALC-SCNC: 17 MMOL/L — SIGNIFICANT CHANGE UP (ref 5–17)
APTT BLD: 26.1 SEC — LOW (ref 27.5–37.4)
AST SERPL-CCNC: 16 U/L — SIGNIFICANT CHANGE UP (ref 10–40)
BASOPHILS NFR BLD AUTO: 0.1 % — SIGNIFICANT CHANGE UP (ref 0–2)
BILIRUB SERPL-MCNC: 0.2 MG/DL — SIGNIFICANT CHANGE UP (ref 0.2–1.2)
BLD GP AB SCN SERPL QL: NEGATIVE — SIGNIFICANT CHANGE UP
BUN SERPL-MCNC: 22 MG/DL — SIGNIFICANT CHANGE UP (ref 7–23)
CALCIUM SERPL-MCNC: 10.4 MG/DL — SIGNIFICANT CHANGE UP (ref 8.4–10.5)
CHLORIDE SERPL-SCNC: 95 MMOL/L — LOW (ref 96–108)
CO2 SERPL-SCNC: 26 MMOL/L — SIGNIFICANT CHANGE UP (ref 22–31)
CREAT SERPL-MCNC: 0.78 MG/DL — SIGNIFICANT CHANGE UP (ref 0.5–1.3)
EOSINOPHIL NFR BLD AUTO: 0.2 % — SIGNIFICANT CHANGE UP (ref 0–6)
GLUCOSE SERPL-MCNC: 125 MG/DL — HIGH (ref 70–99)
HCT VFR BLD CALC: 37.7 % — SIGNIFICANT CHANGE UP (ref 34.5–45)
HGB BLD-MCNC: 12.1 G/DL — SIGNIFICANT CHANGE UP (ref 11.5–15.5)
INR BLD: 0.98 — SIGNIFICANT CHANGE UP (ref 0.88–1.16)
LYMPHOCYTES # BLD AUTO: 9.2 % — LOW (ref 13–44)
MCHC RBC-ENTMCNC: 29.1 PG — SIGNIFICANT CHANGE UP (ref 27–34)
MCHC RBC-ENTMCNC: 32.1 G/DL — SIGNIFICANT CHANGE UP (ref 32–36)
MCV RBC AUTO: 90.6 FL — SIGNIFICANT CHANGE UP (ref 80–100)
MONOCYTES NFR BLD AUTO: 3.2 % — SIGNIFICANT CHANGE UP (ref 2–14)
NEUTROPHILS NFR BLD AUTO: 87.3 % — HIGH (ref 43–77)
PLATELET # BLD AUTO: 464 K/UL — HIGH (ref 150–400)
POTASSIUM SERPL-MCNC: 3.2 MMOL/L — LOW (ref 3.5–5.3)
POTASSIUM SERPL-SCNC: 3.2 MMOL/L — LOW (ref 3.5–5.3)
PROT SERPL-MCNC: 7.2 G/DL — SIGNIFICANT CHANGE UP (ref 6–8.3)
PROTHROM AB SERPL-ACNC: 10.9 SEC — SIGNIFICANT CHANGE UP (ref 9.8–12.7)
RBC # BLD: 4.16 M/UL — SIGNIFICANT CHANGE UP (ref 3.8–5.2)
RBC # FLD: 14.9 % — SIGNIFICANT CHANGE UP (ref 10.3–16.9)
RH IG SCN BLD-IMP: POSITIVE — SIGNIFICANT CHANGE UP
SODIUM SERPL-SCNC: 138 MMOL/L — SIGNIFICANT CHANGE UP (ref 135–145)
WBC # BLD: 10.8 K/UL — HIGH (ref 3.8–10.5)
WBC # FLD AUTO: 10.8 K/UL — HIGH (ref 3.8–10.5)

## 2017-12-19 PROCEDURE — 88300 SURGICAL PATH GROSS: CPT

## 2017-12-19 PROCEDURE — 71010: CPT | Mod: 26

## 2017-12-19 PROCEDURE — 73700 CT LOWER EXTREMITY W/O DYE: CPT | Mod: 26,LT

## 2017-12-19 PROCEDURE — 86901 BLOOD TYPING SEROLOGIC RH(D): CPT

## 2017-12-19 PROCEDURE — 97161 PT EVAL LOW COMPLEX 20 MIN: CPT

## 2017-12-19 PROCEDURE — 76000 FLUOROSCOPY <1 HR PHYS/QHP: CPT

## 2017-12-19 PROCEDURE — C1713: CPT

## 2017-12-19 PROCEDURE — 73501 X-RAY EXAM HIP UNI 1 VIEW: CPT | Mod: 26,LT

## 2017-12-19 PROCEDURE — 73502 X-RAY EXAM HIP UNI 2-3 VIEWS: CPT | Mod: 26,LT

## 2017-12-19 PROCEDURE — 80048 BASIC METABOLIC PNL TOTAL CA: CPT

## 2017-12-19 PROCEDURE — 93010 ELECTROCARDIOGRAM REPORT: CPT

## 2017-12-19 PROCEDURE — 85027 COMPLETE CBC AUTOMATED: CPT

## 2017-12-19 PROCEDURE — C1776: CPT

## 2017-12-19 PROCEDURE — 36415 COLL VENOUS BLD VENIPUNCTURE: CPT

## 2017-12-19 PROCEDURE — 86900 BLOOD TYPING SEROLOGIC ABO: CPT

## 2017-12-19 PROCEDURE — 86850 RBC ANTIBODY SCREEN: CPT

## 2017-12-19 PROCEDURE — 87641 MR-STAPH DNA AMP PROBE: CPT

## 2017-12-19 PROCEDURE — 73502 X-RAY EXAM HIP UNI 2-3 VIEWS: CPT | Mod: 26

## 2017-12-19 PROCEDURE — 97116 GAIT TRAINING THERAPY: CPT

## 2017-12-19 PROCEDURE — 99285 EMERGENCY DEPT VISIT HI MDM: CPT | Mod: 25

## 2017-12-19 RX ORDER — ONDANSETRON 8 MG/1
4 TABLET, FILM COATED ORAL EVERY 4 HOURS
Qty: 0 | Refills: 0 | Status: DISCONTINUED | OUTPATIENT
Start: 2017-12-19 | End: 2017-12-22

## 2017-12-19 RX ORDER — PROPOFOL 10 MG/ML
140 INJECTION, EMULSION INTRAVENOUS ONCE
Qty: 0 | Refills: 0 | Status: DISCONTINUED | OUTPATIENT
Start: 2017-12-19 | End: 2017-12-19

## 2017-12-19 RX ORDER — METOCLOPRAMIDE HCL 10 MG
10 TABLET ORAL EVERY 6 HOURS
Qty: 0 | Refills: 0 | Status: DISCONTINUED | OUTPATIENT
Start: 2017-12-19 | End: 2017-12-22

## 2017-12-19 RX ORDER — MORPHINE SULFATE 50 MG/1
4 CAPSULE, EXTENDED RELEASE ORAL ONCE
Qty: 0 | Refills: 0 | Status: DISCONTINUED | OUTPATIENT
Start: 2017-12-19 | End: 2017-12-19

## 2017-12-19 RX ORDER — ASPIRIN/CALCIUM CARB/MAGNESIUM 324 MG
325 TABLET ORAL
Qty: 0 | Refills: 0 | Status: DISCONTINUED | OUTPATIENT
Start: 2017-12-19 | End: 2017-12-20

## 2017-12-19 RX ORDER — HYDROCHLOROTHIAZIDE 25 MG
0 TABLET ORAL
Qty: 0 | Refills: 0 | COMMUNITY

## 2017-12-19 RX ORDER — HYDROMORPHONE HYDROCHLORIDE 2 MG/ML
1 INJECTION INTRAMUSCULAR; INTRAVENOUS; SUBCUTANEOUS ONCE
Qty: 0 | Refills: 0 | Status: DISCONTINUED | OUTPATIENT
Start: 2017-12-19 | End: 2017-12-19

## 2017-12-19 RX ORDER — OXYCODONE HYDROCHLORIDE 5 MG/1
5 TABLET ORAL EVERY 4 HOURS
Qty: 0 | Refills: 0 | Status: DISCONTINUED | OUTPATIENT
Start: 2017-12-19 | End: 2017-12-20

## 2017-12-19 RX ORDER — PANTOPRAZOLE SODIUM 20 MG/1
40 TABLET, DELAYED RELEASE ORAL
Qty: 0 | Refills: 0 | Status: DISCONTINUED | OUTPATIENT
Start: 2017-12-19 | End: 2017-12-22

## 2017-12-19 RX ORDER — OXYCODONE HYDROCHLORIDE 5 MG/1
10 TABLET ORAL EVERY 4 HOURS
Qty: 0 | Refills: 0 | Status: DISCONTINUED | OUTPATIENT
Start: 2017-12-19 | End: 2017-12-20

## 2017-12-19 RX ORDER — DOCUSATE SODIUM 100 MG
100 CAPSULE ORAL THREE TIMES A DAY
Qty: 0 | Refills: 0 | Status: DISCONTINUED | OUTPATIENT
Start: 2017-12-19 | End: 2017-12-21

## 2017-12-19 RX ORDER — HYDROCHLOROTHIAZIDE 25 MG
25 TABLET ORAL DAILY
Qty: 0 | Refills: 0 | Status: DISCONTINUED | OUTPATIENT
Start: 2017-12-19 | End: 2017-12-22

## 2017-12-19 RX ORDER — MORPHINE SULFATE 50 MG/1
4 CAPSULE, EXTENDED RELEASE ORAL EVERY 4 HOURS
Qty: 0 | Refills: 0 | Status: DISCONTINUED | OUTPATIENT
Start: 2017-12-19 | End: 2017-12-20

## 2017-12-19 RX ORDER — PROPOFOL 10 MG/ML
110 INJECTION, EMULSION INTRAVENOUS ONCE
Qty: 0 | Refills: 0 | Status: COMPLETED | OUTPATIENT
Start: 2017-12-19 | End: 2017-12-19

## 2017-12-19 RX ORDER — ATORVASTATIN CALCIUM 80 MG/1
20 TABLET, FILM COATED ORAL AT BEDTIME
Qty: 0 | Refills: 0 | Status: DISCONTINUED | OUTPATIENT
Start: 2017-12-19 | End: 2017-12-22

## 2017-12-19 RX ORDER — ACETAMINOPHEN 500 MG
650 TABLET ORAL EVERY 6 HOURS
Qty: 0 | Refills: 0 | Status: DISCONTINUED | OUTPATIENT
Start: 2017-12-19 | End: 2017-12-22

## 2017-12-19 RX ORDER — SODIUM CHLORIDE 9 MG/ML
1000 INJECTION, SOLUTION INTRAVENOUS
Qty: 0 | Refills: 0 | Status: DISCONTINUED | OUTPATIENT
Start: 2017-12-19 | End: 2017-12-21

## 2017-12-19 RX ORDER — HEPARIN SODIUM 5000 [USP'U]/ML
5000 INJECTION INTRAVENOUS; SUBCUTANEOUS EVERY 8 HOURS
Qty: 0 | Refills: 0 | Status: DISCONTINUED | OUTPATIENT
Start: 2017-12-19 | End: 2017-12-19

## 2017-12-19 RX ORDER — MAGNESIUM HYDROXIDE 400 MG/1
30 TABLET, CHEWABLE ORAL DAILY
Qty: 0 | Refills: 0 | Status: DISCONTINUED | OUTPATIENT
Start: 2017-12-19 | End: 2017-12-21

## 2017-12-19 RX ORDER — ACETAMINOPHEN 500 MG
650 TABLET ORAL EVERY 6 HOURS
Qty: 0 | Refills: 0 | Status: DISCONTINUED | OUTPATIENT
Start: 2017-12-19 | End: 2017-12-21

## 2017-12-19 RX ORDER — ONDANSETRON 8 MG/1
4 TABLET, FILM COATED ORAL ONCE
Qty: 0 | Refills: 0 | Status: COMPLETED | OUTPATIENT
Start: 2017-12-19 | End: 2017-12-19

## 2017-12-19 RX ADMIN — MORPHINE SULFATE 4 MILLIGRAM(S): 50 CAPSULE, EXTENDED RELEASE ORAL at 19:45

## 2017-12-19 RX ADMIN — HYDROMORPHONE HYDROCHLORIDE 1 MILLIGRAM(S): 2 INJECTION INTRAMUSCULAR; INTRAVENOUS; SUBCUTANEOUS at 19:48

## 2017-12-19 RX ADMIN — HYDROMORPHONE HYDROCHLORIDE 1 MILLIGRAM(S): 2 INJECTION INTRAMUSCULAR; INTRAVENOUS; SUBCUTANEOUS at 23:13

## 2017-12-19 RX ADMIN — MORPHINE SULFATE 4 MILLIGRAM(S): 50 CAPSULE, EXTENDED RELEASE ORAL at 19:15

## 2017-12-19 RX ADMIN — OXYCODONE HYDROCHLORIDE 5 MILLIGRAM(S): 5 TABLET ORAL at 23:49

## 2017-12-19 RX ADMIN — ONDANSETRON 4 MILLIGRAM(S): 8 TABLET, FILM COATED ORAL at 19:15

## 2017-12-19 RX ADMIN — PROPOFOL 110 MILLIGRAM(S): 10 INJECTION, EMULSION INTRAVENOUS at 20:50

## 2017-12-19 NOTE — H&P ADULT - HISTORY OF PRESENT ILLNESS
Patient is 72 F with R LUCIANA 11/8/17. Come in after she felt a click in her L hip and has been having pain and unable to bear weight.

## 2017-12-19 NOTE — ED PROVIDER NOTE - OBJECTIVE STATEMENT
72F with concern for L hip pain concern for dislocation. Pt had hip replacement 6 weeks prior, bent down to  cat and felt L hip pop out. No head injury no syncope.

## 2017-12-19 NOTE — H&P ADULT - ASSESSMENT
1. Admit to Ortho  2. Regular Diet  3.  mg BID  4. Pain meds  5. WBAT in KI  6. CT Scan of the L hip

## 2017-12-19 NOTE — H&P ADULT - NSHPPHYSICALEXAM_GEN_ALL_CORE
Pre reduction:    WWP, BCR  DP/PT  Motor: EHL/FHL/GS/AT  Sensory: DP/SP/AT, MP/LP/S/S SILT  ROM: Limited ROM at L hip due to pain  Deformity: Slight ER with shortening      Post Reduction:    WWP, BCR  DP/PT  Motor: EHL/FHL/GS/AT  Sensory: DP/SP/AT, MP/LP/S/S SILT  ROM: Painless ROM, but patient placed in KI  Deformity: None

## 2017-12-19 NOTE — ED ADULT NURSE NOTE - OBJECTIVE STATEMENT
Pt states " I had left hip replacement done on 11/8/17 and today I was at the vet and my cat jumped to the floor. I bend to pick him up and dislocated my hip. I am in so much pain".

## 2017-12-19 NOTE — ED ADULT NURSE REASSESSMENT NOTE - NS ED NURSE REASSESS COMMENT FT1
Vital signs on Conscious sedation sheet. Vital signs on Conscious sedation sheet.n Propofol IV a total of 110 mg given during the procedure. Vital signs on Conscious sedation sheet.  Capnography from 41 - 45.  Propofol IV a total of 110 mg given during the procedure. Vital signs on Conscious sedation sheet.  Capnography from 41 - 45 mmHg..  Propofol IV a total of 110 mg given during the procedure.

## 2017-12-19 NOTE — H&P ADULT - ATTENDING COMMENTS
Pt was seen/ examined  Closed reduction attempted in ED 12/19/17  X-ray demonstrated incomplete reduction  Pt. Admitted./ CT ordered    Today, pt. seen/ examined  Discussed that CT is suspicious for poly failure  Discussed low probability of success with closed reduction   Discussed risks/ benefits/ alternatives of open reduction in detail   Will proceed to OR

## 2017-12-19 NOTE — ED PROVIDER NOTE - MEDICAL DECISION MAKING DETAILS
72F with concern for L hip pain, L hip dislocation. Vitals notable for HTN, pt is uncomfortable due to pain. Pt reduced under moderate sedation with propofol, no complications noted. 1 hour prior to reduction given Dilaudid and Morphine without pain relief. Pain relief achieved with reduction only. Will admit for further pain control.

## 2017-12-19 NOTE — ED PROVIDER NOTE - ENMT, MLM
Airway patent, Nasal mucosa clear. oropharynx clear 2+ dp/pt pulse, sensation intact to light touch l4/l5/s1, concern for pain with rom of l hip

## 2017-12-19 NOTE — H&P ADULT - NSHPLABSRESULTS_GEN_ALL_CORE
CBC Full  -  ( 19 Dec 2017 19:25 )  WBC Count : 10.8 K/uL  Hemoglobin : 12.1 g/dL  Hematocrit : 37.7 %  Platelet Count - Automated : 464 K/uL  Mean Cell Volume : 90.6 fL  Mean Cell Hemoglobin : 29.1 pg  Mean Cell Hemoglobin Concentration : 32.1 g/dL  Auto Neutrophil # : x  Auto Lymphocyte # : x  Auto Monocyte # : x  Auto Eosinophil # : x  Auto Basophil # : x  Auto Neutrophil % : 87.3 %  Auto Lymphocyte % : 9.2 %  Auto Monocyte % : 3.2 %  Auto Eosinophil % : 0.2 %  Auto Basophil % : 0.1 %      12-19    138  |  95<L>  |  22  ----------------------------<  125<H>  3.2<L>   |  26  |  0.78    Ca    10.4      19 Dec 2017 19:25    TPro  7.2  /  Alb  4.3  /  TBili  0.2  /  DBili  x   /  AST  16  /  ALT  11  /  AlkPhos  114  12-19

## 2017-12-20 DIAGNOSIS — Z01.818 ENCOUNTER FOR OTHER PREPROCEDURAL EXAMINATION: ICD-10-CM

## 2017-12-20 PROBLEM — J45.909 UNSPECIFIED ASTHMA, UNCOMPLICATED: Chronic | Status: ACTIVE | Noted: 2017-11-07

## 2017-12-20 PROBLEM — M19.90 UNSPECIFIED OSTEOARTHRITIS, UNSPECIFIED SITE: Chronic | Status: ACTIVE | Noted: 2017-11-07

## 2017-12-20 PROBLEM — I10 ESSENTIAL (PRIMARY) HYPERTENSION: Chronic | Status: ACTIVE | Noted: 2017-11-07

## 2017-12-20 PROBLEM — K21.9 GASTRO-ESOPHAGEAL REFLUX DISEASE WITHOUT ESOPHAGITIS: Chronic | Status: ACTIVE | Noted: 2017-11-07

## 2017-12-20 PROBLEM — M81.0 AGE-RELATED OSTEOPOROSIS WITHOUT CURRENT PATHOLOGICAL FRACTURE: Chronic | Status: ACTIVE | Noted: 2017-11-07

## 2017-12-20 PROBLEM — E78.5 HYPERLIPIDEMIA, UNSPECIFIED: Chronic | Status: ACTIVE | Noted: 2017-11-07

## 2017-12-20 PROBLEM — K57.92 DIVERTICULITIS OF INTESTINE, PART UNSPECIFIED, WITHOUT PERFORATION OR ABSCESS WITHOUT BLEEDING: Chronic | Status: ACTIVE | Noted: 2017-11-07

## 2017-12-20 LAB
ANION GAP SERPL CALC-SCNC: 11 MMOL/L — SIGNIFICANT CHANGE UP (ref 5–17)
APPEARANCE UR: CLEAR — SIGNIFICANT CHANGE UP
BACTERIA # UR AUTO: PRESENT /HPF
BILIRUB UR-MCNC: NEGATIVE — SIGNIFICANT CHANGE UP
BLD GP AB SCN SERPL QL: NEGATIVE — SIGNIFICANT CHANGE UP
BUN SERPL-MCNC: 15 MG/DL — SIGNIFICANT CHANGE UP (ref 7–23)
CALCIUM SERPL-MCNC: 9.8 MG/DL — SIGNIFICANT CHANGE UP (ref 8.4–10.5)
CHLORIDE SERPL-SCNC: 97 MMOL/L — SIGNIFICANT CHANGE UP (ref 96–108)
CO2 SERPL-SCNC: 30 MMOL/L — SIGNIFICANT CHANGE UP (ref 22–31)
COLOR SPEC: YELLOW — SIGNIFICANT CHANGE UP
COMMENT - URINE: SIGNIFICANT CHANGE UP
CREAT SERPL-MCNC: 0.75 MG/DL — SIGNIFICANT CHANGE UP (ref 0.5–1.3)
DIFF PNL FLD: NEGATIVE — SIGNIFICANT CHANGE UP
EPI CELLS # UR: (no result) /HPF (ref 0–5)
GLUCOSE SERPL-MCNC: 102 MG/DL — HIGH (ref 70–99)
GLUCOSE UR QL: NEGATIVE — SIGNIFICANT CHANGE UP
HCT VFR BLD CALC: 34.8 % — SIGNIFICANT CHANGE UP (ref 34.5–45)
HGB BLD-MCNC: 11.1 G/DL — LOW (ref 11.5–15.5)
KETONES UR-MCNC: (no result) MG/DL
LEUKOCYTE ESTERASE UR-ACNC: (no result)
MCHC RBC-ENTMCNC: 29.1 PG — SIGNIFICANT CHANGE UP (ref 27–34)
MCHC RBC-ENTMCNC: 31.9 G/DL — LOW (ref 32–36)
MCV RBC AUTO: 91.1 FL — SIGNIFICANT CHANGE UP (ref 80–100)
NITRITE UR-MCNC: NEGATIVE — SIGNIFICANT CHANGE UP
PH UR: 6 — SIGNIFICANT CHANGE UP (ref 5–8)
PLATELET # BLD AUTO: 389 K/UL — SIGNIFICANT CHANGE UP (ref 150–400)
POTASSIUM SERPL-MCNC: 4.1 MMOL/L — SIGNIFICANT CHANGE UP (ref 3.5–5.3)
POTASSIUM SERPL-SCNC: 4.1 MMOL/L — SIGNIFICANT CHANGE UP (ref 3.5–5.3)
PROT UR-MCNC: NEGATIVE MG/DL — SIGNIFICANT CHANGE UP
RBC # BLD: 3.82 M/UL — SIGNIFICANT CHANGE UP (ref 3.8–5.2)
RBC # FLD: 15.3 % — SIGNIFICANT CHANGE UP (ref 10.3–16.9)
RBC CASTS # UR COMP ASSIST: < 5 /HPF — SIGNIFICANT CHANGE UP
RH IG SCN BLD-IMP: POSITIVE — SIGNIFICANT CHANGE UP
SODIUM SERPL-SCNC: 138 MMOL/L — SIGNIFICANT CHANGE UP (ref 135–145)
SP GR SPEC: 1.02 — SIGNIFICANT CHANGE UP (ref 1–1.03)
UROBILINOGEN FLD QL: 0.2 E.U./DL — SIGNIFICANT CHANGE UP
WBC # BLD: 7.7 K/UL — SIGNIFICANT CHANGE UP (ref 3.8–10.5)
WBC # FLD AUTO: 7.7 K/UL — SIGNIFICANT CHANGE UP (ref 3.8–10.5)
WBC UR QL: < 5 /HPF — SIGNIFICANT CHANGE UP

## 2017-12-20 PROCEDURE — 93010 ELECTROCARDIOGRAM REPORT: CPT

## 2017-12-20 PROCEDURE — 99221 1ST HOSP IP/OBS SF/LOW 40: CPT | Mod: GC

## 2017-12-20 RX ORDER — ASPIRIN/CALCIUM CARB/MAGNESIUM 324 MG
325 TABLET ORAL
Qty: 0 | Refills: 0 | Status: DISCONTINUED | OUTPATIENT
Start: 2017-12-21 | End: 2017-12-22

## 2017-12-20 RX ORDER — OXYCODONE HYDROCHLORIDE 5 MG/1
5 TABLET ORAL EVERY 4 HOURS
Qty: 0 | Refills: 0 | Status: DISCONTINUED | OUTPATIENT
Start: 2017-12-21 | End: 2017-12-22

## 2017-12-20 RX ORDER — MORPHINE SULFATE 50 MG/1
2 CAPSULE, EXTENDED RELEASE ORAL ONCE
Qty: 0 | Refills: 0 | Status: DISCONTINUED | OUTPATIENT
Start: 2017-12-21 | End: 2017-12-22

## 2017-12-20 RX ORDER — SODIUM CHLORIDE 9 MG/ML
1000 INJECTION, SOLUTION INTRAVENOUS
Qty: 0 | Refills: 0 | Status: DISCONTINUED | OUTPATIENT
Start: 2017-12-21 | End: 2017-12-22

## 2017-12-20 RX ORDER — MORPHINE SULFATE 50 MG/1
2 CAPSULE, EXTENDED RELEASE ORAL EVERY 4 HOURS
Qty: 0 | Refills: 0 | Status: DISCONTINUED | OUTPATIENT
Start: 2017-12-21 | End: 2017-12-22

## 2017-12-20 RX ORDER — MAGNESIUM HYDROXIDE 400 MG/1
30 TABLET, CHEWABLE ORAL DAILY
Qty: 0 | Refills: 0 | Status: DISCONTINUED | OUTPATIENT
Start: 2017-12-21 | End: 2017-12-22

## 2017-12-20 RX ORDER — ACETAMINOPHEN 500 MG
650 TABLET ORAL EVERY 6 HOURS
Qty: 0 | Refills: 0 | Status: DISCONTINUED | OUTPATIENT
Start: 2017-12-21 | End: 2017-12-22

## 2017-12-20 RX ORDER — OXYCODONE HYDROCHLORIDE 5 MG/1
10 TABLET ORAL EVERY 4 HOURS
Qty: 0 | Refills: 0 | Status: DISCONTINUED | OUTPATIENT
Start: 2017-12-21 | End: 2017-12-22

## 2017-12-20 RX ORDER — CEFAZOLIN SODIUM 1 G
2000 VIAL (EA) INJECTION EVERY 8 HOURS
Qty: 0 | Refills: 0 | Status: DISCONTINUED | OUTPATIENT
Start: 2017-12-21 | End: 2017-12-22

## 2017-12-20 RX ORDER — POLYETHYLENE GLYCOL 3350 17 G/17G
17 POWDER, FOR SOLUTION ORAL DAILY
Qty: 0 | Refills: 0 | Status: DISCONTINUED | OUTPATIENT
Start: 2017-12-21 | End: 2017-12-22

## 2017-12-20 RX ORDER — DOCUSATE SODIUM 100 MG
100 CAPSULE ORAL THREE TIMES A DAY
Qty: 0 | Refills: 0 | Status: DISCONTINUED | OUTPATIENT
Start: 2017-12-21 | End: 2017-12-22

## 2017-12-20 RX ORDER — SENNA PLUS 8.6 MG/1
2 TABLET ORAL AT BEDTIME
Qty: 0 | Refills: 0 | Status: DISCONTINUED | OUTPATIENT
Start: 2017-12-21 | End: 2017-12-22

## 2017-12-20 RX ADMIN — SODIUM CHLORIDE 100 MILLILITER(S): 9 INJECTION, SOLUTION INTRAVENOUS at 00:00

## 2017-12-20 RX ADMIN — OXYCODONE HYDROCHLORIDE 5 MILLIGRAM(S): 5 TABLET ORAL at 00:49

## 2017-12-20 RX ADMIN — PANTOPRAZOLE SODIUM 40 MILLIGRAM(S): 20 TABLET, DELAYED RELEASE ORAL at 05:39

## 2017-12-20 RX ADMIN — SODIUM CHLORIDE 100 MILLILITER(S): 9 INJECTION, SOLUTION INTRAVENOUS at 06:40

## 2017-12-20 RX ADMIN — MORPHINE SULFATE 4 MILLIGRAM(S): 50 CAPSULE, EXTENDED RELEASE ORAL at 19:19

## 2017-12-20 RX ADMIN — MORPHINE SULFATE 4 MILLIGRAM(S): 50 CAPSULE, EXTENDED RELEASE ORAL at 19:34

## 2017-12-20 NOTE — PROGRESS NOTE ADULT - SUBJECTIVE AND OBJECTIVE BOX
Ortho Preop Note    Patient is a 72y old  Female who presents with a chief complaint of L Hip pain and LLE shortening and internal rotation (19 Dec 2017 23:22)    Diagnosis: L LUCIANA closed manipulation versus open reduction  Procedure: L LUCIANA Closed manipulation versus open reduction  Surgeon: Amalia                          12.1   10.8  )-----------( 464      ( 19 Dec 2017 19:25 )             37.7     12-19    138  |  95<L>  |  22  ----------------------------<  125<H>  3.2<L>   |  26  |  0.78    Ca    10.4      19 Dec 2017 19:25    TPro  7.2  /  Alb  4.3  /  TBili  0.2  /  DBili  x   /  AST  16  /  ALT  11  /  AlkPhos  114  12-19    PT/INR - ( 19 Dec 2017 19:25 )   PT: 10.9 sec;   INR: 0.98          PTT - ( 19 Dec 2017 19:25 )  PTT:26.1 sec      [x ] Type & Screen  [x ] CBC  [x ] BMP  [x ] PT/PTT/INR  [x ] Urinalysis  [x ] Chest X-ray  [x ] EKG  [x ] NPO/IVF  [x ] Consent  [ ] Clearance  [x ] Added on to OR Schedule  [x ] Anti-coagulation held  [ ] MRSA/MSSA Nasal Screen     Assessment & Plan:  72yFemale with L LUCIANA Dislocation with non concentric closed reduction  -For OR 12/20/17    Ortho Pager [phone] Ortho Preop Note    Patient is a 72y old  Female who presents with a chief complaint of L Hip pain and LLE shortening and internal rotation (19 Dec 2017 23:22)    Diagnosis: L LUCIANA closed manipulation versus open reduction  Procedure: L LUCIANA Closed manipulation versus open reduction  Surgeon: Torey                          12.1   10.8  )-----------( 464      ( 19 Dec 2017 19:25 )             37.7     12-19    138  |  95<L>  |  22  ----------------------------<  125<H>  3.2<L>   |  26  |  0.78    Ca    10.4      19 Dec 2017 19:25    TPro  7.2  /  Alb  4.3  /  TBili  0.2  /  DBili  x   /  AST  16  /  ALT  11  /  AlkPhos  114  12-19    PT/INR - ( 19 Dec 2017 19:25 )   PT: 10.9 sec;   INR: 0.98          PTT - ( 19 Dec 2017 19:25 )  PTT:26.1 sec      [x ] Type & Screen  [x ] CBC  [x ] BMP  [x ] PT/PTT/INR  [x ] Urinalysis  [x ] Chest X-ray  [x ] EKG  [x ] NPO/IVF  [x ] Consent  [ ] Clearance  [x ] Added on to OR Schedule  [x ] Anti-coagulation held  [ ] MRSA/MSSA Nasal Screen     Assessment & Plan:  72yFemale with L LUCIANA Dislocation with non concentric closed reduction  -For OR 12/20/17    Ortho Pager [phone]

## 2017-12-20 NOTE — CONSULT NOTE ADULT - ASSESSMENT
A/P    73 yo s/p L THR in 11/8 cb complete posterosuperior dislocation, plan for OR for closed manipulation vs open reduction.  Medicine consulted for pre-op clearance.    Pre-Op assessment:  pt with no hx of problems with prior anesthesia, Mallampati class II  on no ASA, BB, ACE or ARBs  CBC/CMP/ALB/ECG and CXR unremarkable  Disease specific:  1, Cards: No Hx of IHD/HF/CAD/ hx of arrhythmias  based on RCRI score, risk of MACE <1%  2, Pulm: Non smoker, remote Hx of asthma asymptomatic for years, no LINDA/COPD or PULM HTN  based on Garsia score: risk or PRF: 0.2 %  3, ID: no active infection based on exam and labs  4, Renal: WNL  5, nutritional status: optimized  6, metabolic: no Hx of DM/thyroid, BG at goal A/P    73 yo s/p L THR in 11/8 cb complete posterosuperior dislocation, plan for OR for closed manipulation vs open reduction.  Medicine consulted for pre-op clearance.    Pre-Op assessment:  overall functional status:  HDS, independent of IADLs, >10 METs  pt with no hx of problems with prior anesthesia, Mallampati class II  on no ASA, BB, ACE or ARBs  CBC/CMP/ALB/ECG and CXR unremarkable  Disease specific:  1, Cards: No Hx of IHD/HF/CAD/ hx of arrhythmias  based on RCRI score, risk of MACE <1%  2, Pulm: Non smoker, remote Hx of asthma asymptomatic for years, no LINDA/COPD or PULM HTN  based on Garsia score: risk of PRF: 0.2 %  3, ID: no active infection based on exam and labs  4, Renal: WNL  5, nutritional status: optimized  6, metabolic: no Hx of DM/thyroid, BG at goal

## 2017-12-20 NOTE — CONSULT NOTE ADULT - ATTENDING COMMENTS
Pt seen and examined at bedside on 12/20 @ 5 am    Agree with HPI, ROS as above.     VS, Labs, FH, SH, allergies, medications, imaging reviewed. I personally reviewed the EKG - NSR. Agree with physical exam as above     A/P: 73 yo s/p L THR in 11/8 cb complete posterosuperior dislocation, plan for OR for closed manipulation vs open reduction, medicine consulted for preoperative clearance    **Preoperative clearance  -RCRI 0, Patient is able to perform 4 METS at baseline. Class I risk for moderate risk procedure  -Patient is medically optimized for surgery - would hold home medications day of procedure, can resume after  -Has previously tolerated orthopedic surgery (in November 2017) and general anesthesia without issue    Plan otherwise as outlined above....

## 2017-12-20 NOTE — CONSULT NOTE ADULT - SUBJECTIVE AND OBJECTIVE BOX
HPI:  71 yo F, s/p L JESSICA in 11/8, presenting with hip pain inability to perform weight bearing activities after feeling a click in her left hip, found to have posterosuperior dislocation of left total prosthesis,   close reduction attempted however on post procedure imaging there is a questioof soft tissue , muscle or labrum entrapment plan for OR on 12/20 for L LUCIANA Closed manipulation versus open reduction.     Past Medical History:  Asthma    Diverticulitis    GERD (gastroesophageal reflux disease)    HLD (hyperlipidemia)    HTN (hypertension)    OA (osteoarthritis)    Osteoporosis HPI:  73 yo F, s/p L JESSICA in 11/8, presenting with hip pain inability to perform weight bearing activities after feeling a click in her left hip, found to have posterosuperior dislocation of left total prosthesis,   close reduction attempted however on post procedure imaging there is a questioof soft tissue , muscle/labrum entrapment, plan for OR on 12/20 for L LUCIANA Closed manipulation versus open  reduction.     Past Medical History:  Remote Asthma, asymptomatic for years  Diverticulitis    GERD (gastroesophageal reflux disease)    HLD/HLD  OA (osteoarthritis)       Past Surgical History:  H/O discectomy  lumbar 9/2012  H/O laminectomy    H/O pilonidal cyst  removed  History of hip replacement, total, right  2014  History of tonsillectomy.    Allergies and Intolerances:        Allergies:  	No Known Drug Allergies:   	adhesives: Miscellaneous, Rash   Social History:  Non smoker, social drinking, denies illicit drug use      Home medications:  Nexium 20 mg ER  Lipitor 20 mg at bedtime  HCTZ 25 mg daily      ROS:  All other review of systems negative, except as noted in HPI      VITAL SIGNS:  T(C): 36.4 (12-19-17 @ 23:47), Max: 36.7 (12-19-17 @ 18:20)  T(F): 97.5 (12-19-17 @ 23:47), Max: 98 (12-19-17 @ 18:20)  HR: 77 (12-19-17 @ 23:47) (69 - 77)  BP: 131/85 (12-19-17 @ 23:47) (131/85 - 164/86)  BP(mean): --  RR: 16 (12-19-17 @ 23:47) (16 - 18)  SpO2: 95% (12-19-17 @ 23:47) (95% - 98%)  Wt(kg): --      PHYSICAL EXAM:  Constitutional: WDWN resting comfortably in bed; NAD  HEENT: NC/AT, PERRL, EOMI, mild nasal congestion present, no tonsillar exudate or hypertrophy, no skin lesions, MMM  Neck: supple; no JVD or thyromegaly  Respiratory: CTA B/L  Cardiac: +S1/S2; RRR; no M/R/G  Gastrointestinal: soft, NT/ND; no rebound or guarding; +BS  Back: spine midline, no bony tenderness or step-offs; no CVAT B/L, lower mid back surgical incision well healed   Extremities: WWP, no clubbing or cyanosis; no peripheral edema, left knee immobilizer present, pt ambulatory with walker in no pain, left anterior groin incision well healed without drainage or erythema  Vascular: 2+ radial and DP pulses B/L  Lymphatic: no submandibular or cervical LAD  Neurologic: AAOx3; CNII-XII grossly intact; no focal deficits, gait stable, ambulating with walker, UE DTRs intact, LE DTRs not assessed   Psychiatric: affect and characteristics of appearance, verbalizations, behaviors are appropriate    T/L/D: PIVs c/d/i      Labs and Imaging:     Mild leukocytosis at 10.8 with neutrophilic predominance and the absence of other infectious markers : likely in the setting of acute trauma and inflammation  mild thrombocytopenia at 460  hypokalemia at 3.2, likely in the setting of HCTZ and poor PO intake   Normal coags, BG and Kidnety function    ECG: NSR  CXR: No focal consolidation, pleural effusions or pneumothorax.  Pelvic X ray:  Complete posterosuperior dislocation of left total prosthesis  There is question cortical discontinuity of the left greater trochanter,   with an adjacent oblong soft tissue density, possibly a hematoma. Cannot   exclude a nondisplaced fracture in this area.  Negative for acute fracture through the bony pelvis. HPI:  73 yo F, s/p L JESSICA in 11/8, presenting with hip pain inability to perform weight bearing activities after feeling a click in her left hip, found to have posterosuperior dislocation of left total prosthesis,   close reduction attempted however on post procedure imaging there is a questioof soft tissue , muscle/labrum entrapment, plan for OR on 12/20 for L LUCIANA Closed manipulation versus open  reduction.     Past Medical History:  Remote Asthma, asymptomatic for years  Diverticulitis    GERD (gastroesophageal reflux disease)    HLD/HLD  OA (osteoarthritis)       Past Surgical History:  H/O discectomy  lumbar 9/2012  H/O laminectomy    H/O pilonidal cyst  removed  History of hip replacement, total, right  2014  History of tonsillectomy.    Allergies and Intolerances:        Allergies:  	No Known Drug Allergies:   	adhesives: Miscellaneous, Rash   Social History:  Non smoker, social drinking, denies illicit drug use      Home medications:  Nexium 20 mg ER  Lipitor 20 mg at bedtime  HCTZ 25 mg daily      ROS:  All other review of systems negative, except as noted in HPI      VITAL SIGNS:  T(C): 36.4 (12-19-17 @ 23:47), Max: 36.7 (12-19-17 @ 18:20)  T(F): 97.5 (12-19-17 @ 23:47), Max: 98 (12-19-17 @ 18:20)  HR: 77 (12-19-17 @ 23:47) (69 - 77)  BP: 131/85 (12-19-17 @ 23:47) (131/85 - 164/86)  BP(mean): --  RR: 16 (12-19-17 @ 23:47) (16 - 18)  SpO2: 95% (12-19-17 @ 23:47) (95% - 98%)  Wt(kg): --      PHYSICAL EXAM:  Constitutional: WDWN resting comfortably in bed; NAD  HEENT: NC/AT, PERRL, EOMI, mild nasal congestion present, no tonsillar exudate or hypertrophy, no skin lesions, MMM, no dentures   Neck: supple; no JVD or thyromegaly  Respiratory: CTA B/L  Cardiac: +S1/S2; RRR; no M/R/G  Gastrointestinal: soft, NT/ND; no rebound or guarding; +BS  Back: spine midline, no bony tenderness or step-offs; no CVAT B/L, lower mid back surgical incision well healed   Extremities: WWP, no clubbing or cyanosis; no peripheral edema, left knee immobilizer present, pt ambulatory with walker in no pain, left anterior groin incision well healed without drainage or erythema  Vascular: 2+ radial and DP pulses B/L  Lymphatic: no submandibular or cervical LAD  Neurologic: AAOx3; CNII-XII grossly intact; no focal deficits, gait stable, ambulating with walker, UE DTRs intact, LE DTRs not assessed   Psychiatric: affect and characteristics of appearance, verbalizations, behaviors are appropriate    T/L/D: PIVs c/d/i      Labs and Imaging:     Mild leukocytosis at 10.8 with neutrophilic predominance and the absence of other infectious markers : likely in the setting of acute trauma and inflammation  mild thrombocytopenia at 460  hypokalemia at 3.2, likely in the setting of HCTZ and poor PO intake   Normal coags, BG and Kidnety function    ECG: NSR  CXR: No focal consolidation, pleural effusions or pneumothorax.  Pelvic X ray:  Complete posterosuperior dislocation of left total prosthesis  There is question cortical discontinuity of the left greater trochanter,   with an adjacent oblong soft tissue density, possibly a hematoma. Cannot   exclude a nondisplaced fracture in this area.  Negative for acute fracture through the bony pelvis.

## 2017-12-20 NOTE — CONSULT NOTE ADULT - PROBLEM SELECTOR RECOMMENDATION 9
- Replete Lytes for K>4 and Mg>2  - proceed with surgery, no further evaluation required - Replete Lytes for K>4 and Mg>2  - Pre-op checklist  - proceed with surgery, no further evaluation or medical optimization required - Replete Lytes for K>4 and Mg>2  - Pre-op checklist  - proceed with surgery, no further evaluation or medical optimization required    to be discussed with Dr. Harper.

## 2017-12-20 NOTE — BRIEF OPERATIVE NOTE - PROCEDURE
<<-----Click on this checkbox to enter Procedure Revision of hip arthroplasty  12/20/2017    Active  [id number]

## 2017-12-21 ENCOUNTER — TRANSCRIPTION ENCOUNTER (OUTPATIENT)
Age: 72
End: 2017-12-21

## 2017-12-21 LAB
ANION GAP SERPL CALC-SCNC: 13 MMOL/L — SIGNIFICANT CHANGE UP (ref 5–17)
ANION GAP SERPL CALC-SCNC: 13 MMOL/L — SIGNIFICANT CHANGE UP (ref 5–17)
ANISOCYTOSIS BLD QL: SLIGHT — SIGNIFICANT CHANGE UP
BUN SERPL-MCNC: 15 MG/DL — SIGNIFICANT CHANGE UP (ref 7–23)
BUN SERPL-MCNC: 16 MG/DL — SIGNIFICANT CHANGE UP (ref 7–23)
CALCIUM SERPL-MCNC: 10.4 MG/DL — SIGNIFICANT CHANGE UP (ref 8.4–10.5)
CALCIUM SERPL-MCNC: 9.7 MG/DL — SIGNIFICANT CHANGE UP (ref 8.4–10.5)
CHLORIDE SERPL-SCNC: 95 MMOL/L — LOW (ref 96–108)
CHLORIDE SERPL-SCNC: 95 MMOL/L — LOW (ref 96–108)
CO2 SERPL-SCNC: 28 MMOL/L — SIGNIFICANT CHANGE UP (ref 22–31)
CO2 SERPL-SCNC: 28 MMOL/L — SIGNIFICANT CHANGE UP (ref 22–31)
CREAT SERPL-MCNC: 0.74 MG/DL — SIGNIFICANT CHANGE UP (ref 0.5–1.3)
CREAT SERPL-MCNC: 0.78 MG/DL — SIGNIFICANT CHANGE UP (ref 0.5–1.3)
GLUCOSE SERPL-MCNC: 169 MG/DL — HIGH (ref 70–99)
GLUCOSE SERPL-MCNC: 170 MG/DL — HIGH (ref 70–99)
GRAM STN FLD: SIGNIFICANT CHANGE UP
HCT VFR BLD CALC: 34.5 % — SIGNIFICANT CHANGE UP (ref 34.5–45)
HCT VFR BLD CALC: 34.6 % — SIGNIFICANT CHANGE UP (ref 34.5–45)
HGB BLD-MCNC: 11.1 G/DL — LOW (ref 11.5–15.5)
HGB BLD-MCNC: 11.2 G/DL — LOW (ref 11.5–15.5)
LG PLATELETS BLD QL AUTO: PRESENT — SIGNIFICANT CHANGE UP
LYMPHOCYTES # BLD AUTO: 5 % — LOW (ref 13–44)
MAGNESIUM SERPL-MCNC: 1.6 MG/DL — SIGNIFICANT CHANGE UP (ref 1.6–2.6)
MANUAL SMEAR VERIFICATION: SIGNIFICANT CHANGE UP
MCHC RBC-ENTMCNC: 29.4 PG — SIGNIFICANT CHANGE UP (ref 27–34)
MCHC RBC-ENTMCNC: 29.5 PG — SIGNIFICANT CHANGE UP (ref 27–34)
MCHC RBC-ENTMCNC: 32.1 G/DL — SIGNIFICANT CHANGE UP (ref 32–36)
MCHC RBC-ENTMCNC: 32.5 G/DL — SIGNIFICANT CHANGE UP (ref 32–36)
MCV RBC AUTO: 90.8 FL — SIGNIFICANT CHANGE UP (ref 80–100)
MCV RBC AUTO: 91.8 FL — SIGNIFICANT CHANGE UP (ref 80–100)
MONOCYTES NFR BLD AUTO: 2 % — SIGNIFICANT CHANGE UP (ref 2–14)
NEUTROPHILS NFR BLD AUTO: 81 % — HIGH (ref 43–77)
NEUTS BAND # BLD: 12 % — SIGNIFICANT CHANGE UP
PHOSPHATE SERPL-MCNC: 2.8 MG/DL — SIGNIFICANT CHANGE UP (ref 2.5–4.5)
PLAT MORPH BLD: NORMAL — SIGNIFICANT CHANGE UP
PLATELET # BLD AUTO: 384 K/UL — SIGNIFICANT CHANGE UP (ref 150–400)
PLATELET # BLD AUTO: 405 K/UL — HIGH (ref 150–400)
PLATELET COUNT - ESTIMATE: ADEQUATE — SIGNIFICANT CHANGE UP
POIKILOCYTOSIS BLD QL AUTO: SLIGHT — SIGNIFICANT CHANGE UP
POTASSIUM SERPL-MCNC: 3.1 MMOL/L — LOW (ref 3.5–5.3)
POTASSIUM SERPL-MCNC: 4.3 MMOL/L — SIGNIFICANT CHANGE UP (ref 3.5–5.3)
POTASSIUM SERPL-SCNC: 3.1 MMOL/L — LOW (ref 3.5–5.3)
POTASSIUM SERPL-SCNC: 4.3 MMOL/L — SIGNIFICANT CHANGE UP (ref 3.5–5.3)
RBC # BLD: 3.77 M/UL — LOW (ref 3.8–5.2)
RBC # BLD: 3.8 M/UL — SIGNIFICANT CHANGE UP (ref 3.8–5.2)
RBC # FLD: 14.9 % — SIGNIFICANT CHANGE UP (ref 10.3–16.9)
RBC # FLD: 15.1 % — SIGNIFICANT CHANGE UP (ref 10.3–16.9)
RBC BLD AUTO: (no result)
SODIUM SERPL-SCNC: 136 MMOL/L — SIGNIFICANT CHANGE UP (ref 135–145)
SODIUM SERPL-SCNC: 136 MMOL/L — SIGNIFICANT CHANGE UP (ref 135–145)
SPECIMEN SOURCE: SIGNIFICANT CHANGE UP
WBC # BLD: 10.1 K/UL — SIGNIFICANT CHANGE UP (ref 3.8–10.5)
WBC # BLD: 7.9 K/UL — SIGNIFICANT CHANGE UP (ref 3.8–10.5)
WBC # FLD AUTO: 10.1 K/UL — SIGNIFICANT CHANGE UP (ref 3.8–10.5)
WBC # FLD AUTO: 7.9 K/UL — SIGNIFICANT CHANGE UP (ref 3.8–10.5)

## 2017-12-21 PROCEDURE — 99233 SBSQ HOSP IP/OBS HIGH 50: CPT | Mod: GC

## 2017-12-21 RX ORDER — LACTOBACILLUS ACIDOPHILUS 100MM CELL
1 CAPSULE ORAL DAILY
Qty: 0 | Refills: 0 | Status: DISCONTINUED | OUTPATIENT
Start: 2017-12-21 | End: 2017-12-22

## 2017-12-21 RX ORDER — CIPROFLOXACIN LACTATE 400MG/40ML
500 VIAL (ML) INTRAVENOUS EVERY 12 HOURS
Qty: 0 | Refills: 0 | Status: DISCONTINUED | OUTPATIENT
Start: 2017-12-21 | End: 2017-12-22

## 2017-12-21 RX ORDER — POTASSIUM CHLORIDE 20 MEQ
10 PACKET (EA) ORAL ONCE
Qty: 0 | Refills: 0 | Status: COMPLETED | OUTPATIENT
Start: 2017-12-21 | End: 2017-12-21

## 2017-12-21 RX ORDER — POTASSIUM CHLORIDE 20 MEQ
40 PACKET (EA) ORAL ONCE
Qty: 0 | Refills: 0 | Status: COMPLETED | OUTPATIENT
Start: 2017-12-21 | End: 2017-12-21

## 2017-12-21 RX ADMIN — Medication 100 MILLIGRAM(S): at 13:00

## 2017-12-21 RX ADMIN — OXYCODONE HYDROCHLORIDE 5 MILLIGRAM(S): 5 TABLET ORAL at 09:04

## 2017-12-21 RX ADMIN — Medication 25 MILLIGRAM(S): at 06:32

## 2017-12-21 RX ADMIN — Medication 1 TABLET(S): at 12:16

## 2017-12-21 RX ADMIN — Medication 325 MILLIGRAM(S): at 05:21

## 2017-12-21 RX ADMIN — Medication 1 DROP(S): at 03:30

## 2017-12-21 RX ADMIN — OXYCODONE HYDROCHLORIDE 5 MILLIGRAM(S): 5 TABLET ORAL at 10:04

## 2017-12-21 RX ADMIN — ATORVASTATIN CALCIUM 20 MILLIGRAM(S): 80 TABLET, FILM COATED ORAL at 21:02

## 2017-12-21 RX ADMIN — PANTOPRAZOLE SODIUM 40 MILLIGRAM(S): 20 TABLET, DELAYED RELEASE ORAL at 06:32

## 2017-12-21 RX ADMIN — OXYCODONE HYDROCHLORIDE 5 MILLIGRAM(S): 5 TABLET ORAL at 20:57

## 2017-12-21 RX ADMIN — OXYCODONE HYDROCHLORIDE 5 MILLIGRAM(S): 5 TABLET ORAL at 21:57

## 2017-12-21 RX ADMIN — Medication 40 MILLIEQUIVALENT(S): at 05:21

## 2017-12-21 RX ADMIN — Medication 100 MILLIGRAM(S): at 20:57

## 2017-12-21 RX ADMIN — Medication 100 MILLIEQUIVALENT(S): at 02:46

## 2017-12-21 RX ADMIN — Medication 100 MILLIGRAM(S): at 05:21

## 2017-12-21 RX ADMIN — Medication 325 MILLIGRAM(S): at 17:16

## 2017-12-21 RX ADMIN — Medication 100 MILLIGRAM(S): at 05:30

## 2017-12-21 NOTE — PHYSICAL THERAPY INITIAL EVALUATION ADULT - DID THE PATIENT HAVE SURGERY?
s/p Left total hip arthroplasty revision (posterior approach)/yes s/p L THR closed reduction with posterior precautions 12/20/yes

## 2017-12-21 NOTE — PHYSICAL THERAPY INITIAL EVALUATION ADULT - GENERAL OBSERVATIONS, REHAB EVAL
Received semi-supine in bed with +call bell, all lines intact, left as received, +JOSE L drain to L hip, dressing c/d/i, on room air, +L knee immobilizer, +SCDs, in no apparent distress. Patient denies pain at rest

## 2017-12-21 NOTE — OCCUPATIONAL THERAPY INITIAL EVALUATION ADULT - GENERAL OBSERVATIONS, REHAB EVAL
Patient cleared for Occupational Therapy by SUPA Wright, patient received supine in non-acute distress, +IV heplock, + left hip dressing C/D/I with +Wound vac and + left knee immobilizer. Patient denies pain.

## 2017-12-21 NOTE — PROGRESS NOTE ADULT - SUBJECTIVE AND OBJECTIVE BOX
INTERVAL HPI/OVERNIGHT EVENTS:    SUBJECTIVE: Patient seen and examined at bedside.    OBJECTIVE:    VITAL SIGNS:  ICU Vital Signs Last 24 Hrs  T(C): 36.6 (21 Dec 2017 09:22), Max: 37.3 (20 Dec 2017 20:00)  T(F): 97.8 (21 Dec 2017 09:22), Max: 99.1 (20 Dec 2017 20:00)  HR: 64 (21 Dec 2017 09:22) (52 - 78)  BP: 120/60 (21 Dec 2017 09:) (89/58 - 131/77)  BP(mean): 65 (21 Dec 2017 02:30) (65 - 82)  ABP: --  ABP(mean): --  RR: 16 (21 Dec 2017 09:22) (10 - 24)  SpO2: 98% (21 Dec 2017 09:) (94% - 99%)         @ 07:01  -   @ 07:00  --------------------------------------------------------  IN: 555 mL / OUT: 1600 mL / NET: -1045 mL     @ 07:01  -   @ 11:32  --------------------------------------------------------  IN: 360 mL / OUT: 300 mL / NET: 60 mL      CAPILLARY BLOOD GLUCOSE          PHYSICAL EXAM:  General: WDWN ; NAD  HEENT: PERRL, anicteric sclera  Neck: supple, no JVD  Respiratory: CTA B/L; no W/R/R  Cardiovascular: +S1/S2; RRR; no M/R/G  Gastrointestinal: soft, NT/ND; +BS x4  Extremities: WWP; 2+ peripheral pulses B/L  Skin: normal color and turgor; no rash      MEDICATIONS:  MEDICATIONS  (STANDING):  aspirin enteric coated 325 milliGRAM(s) Oral two times a day  atorvastatin 20 milliGRAM(s) Oral at bedtime  calcium carbonate 1250 mG + Vitamin D (OsCal 500 + D) 1 Tablet(s) Oral daily  ceFAZolin   IVPB 2000 milliGRAM(s) IV Intermittent every 8 hours  docusate sodium 100 milliGRAM(s) Oral three times a day  hydrochlorothiazide 25 milliGRAM(s) Oral daily  lactated ringers. 1000 milliLiter(s) (80 mL/Hr) IV Continuous <Continuous>  morphine  - Injectable 2 milliGRAM(s) IV Push once  pantoprazole    Tablet 40 milliGRAM(s) Oral before breakfast  polyethylene glycol 3350 17 Gram(s) Oral daily    MEDICATIONS  (PRN):  acetaminophen   Tablet 650 milliGRAM(s) Oral every 6 hours PRN For Temp over 38.3 C (100.94 F)  acetaminophen   Tablet. 650 milliGRAM(s) Oral every 6 hours PRN headache  aluminum hydroxide/magnesium hydroxide/simethicone Suspension 30 milliLiter(s) Oral four times a day PRN Indigestion  artificial  tears Solution 1 Drop(s) Both EYES five times a day PRN Dry Eyes  magnesium hydroxide Suspension 30 milliLiter(s) Oral daily PRN Constipation  metoclopramide Injectable 10 milliGRAM(s) IV Push every 6 hours PRN Nausea and/or Vomiting  morphine  - Injectable 2 milliGRAM(s) IV Push every 4 hours PRN Severe Pain  ondansetron Injectable 4 milliGRAM(s) IV Push every 4 hours PRN Nausea and/or Vomiting  oxyCODONE    IR 5 milliGRAM(s) Oral every 4 hours PRN Mild Pain  oxyCODONE    IR 10 milliGRAM(s) Oral every 4 hours PRN Moderate Pain  senna 2 Tablet(s) Oral at bedtime PRN Constipation      ALLERGIES:  Allergies    adhesives (Rash)  No Known Drug Allergies    Intolerances        LABS:                        11.1   7.9   )-----------( 405      ( 21 Dec 2017 06:51 )             34.6     12-21    136  |  95<L>  |  16  ----------------------------<  169<H>  4.3   |  28  |  0.74    Ca    10.4      21 Dec 2017 06:57  Phos  2.8     12-21  Mg     1.6     12-21    TPro  7.2  /  Alb  4.3  /  TBili  0.2  /  DBili  x   /  AST  16  /  ALT  11  /  AlkPhos  114  12-19    LIVER FUNCTIONS - ( 19 Dec 2017 19:25 )  Alb: 4.3 g/dL / Pro: 7.2 g/dL / ALK PHOS: 114 U/L / ALT: 11 U/L / AST: 16 U/L / GGT: x           PT/INR - ( 19 Dec 2017 19:25 )   PT: 10.9 sec;   INR: 0.98          PTT - ( 19 Dec 2017 19:25 )  PTT:26.1 sec  Urinalysis Basic - ( 20 Dec 2017 06:18 )    Color: Yellow / Appearance: Clear / S.020 / pH: x  Gluc: x / Ketone: Trace mg/dL  / Bili: Negative / Urobili: 0.2 E.U./dL   Blood: x / Protein: NEGATIVE mg/dL / Nitrite: NEGATIVE   Leuk Esterase: Trace / RBC: < 5 /HPF / WBC < 5 /HPF   Sq Epi: x / Non Sq Epi: 5-10 /HPF / Bacteria: Present /HPF            RADIOLOGY & ADDITIONAL TESTS: Reviewed. Medicine Follow Up    INTERVAL HPI/OVERNIGHT EVENTS: S/p revision of left LUCIANA. Overnight BP slightly low to 93/54, improved without intervention.     SUBJECTIVE: Patient seen and examined at bedside. Reports pain is under control. Worked with OT, was able to get up and walk to the bathroom. Had diarrhea yesterday and one BM today. Using incentive spirometer. Reports BP ranges from 140s before she was started on hctz. Baseline now while taking meds is around 120s. Not much of an appetite today, anxious to go home. Denies fever, chills, n/v, headache, lightheadedness, blurry vision, chest pain, dyspnea, abdominal pain.     OBJECTIVE:    VITAL SIGNS:  ICU Vital Signs Last 24 Hrs  T(C): 36.6 (21 Dec 2017 09:22), Max: 37.3 (20 Dec 2017 20:00)  T(F): 97.8 (21 Dec 2017 09:22), Max: 99.1 (20 Dec 2017 20:00)  HR: 64 (21 Dec 2017 09:22) (52 - 78)  BP: 120/60 (21 Dec 2017 09:22) (89/58 - 131/77)  BP(mean): 65 (21 Dec 2017 02:30) (65 - 82)  RR: 16 (21 Dec 2017 09:22) (10 - 24)  SpO2: 98% (21 Dec 2017 09:22) (94% - 99%)         @ :  -   @ 07:00  --------------------------------------------------------  IN: 555 mL / OUT: 1600 mL / NET: -1045 mL     @ 07:  -   @ 11:32  --------------------------------------------------------  IN: 360 mL / OUT: 300 mL / NET: 60 mL      CAPILLARY BLOOD GLUCOSE          PHYSICAL EXAM:  General: WDWN ; NAD  HEENT: PERRL, anicteric sclera  Neck: supple, no JVD  Respiratory: CTA B/L; no W/R/R  Cardiovascular: +S1/S2; RRR; no M/R/G  Gastrointestinal: soft, NT/ND; +BS x4  Extremities: WWP; 2+ peripheral pulses B/L; left leg in knee immobolizer, distal pulses intact with good senation   Skin: normal color and turgor; no rash      MEDICATIONS:  MEDICATIONS  (STANDING):  aspirin enteric coated 325 milliGRAM(s) Oral two times a day  atorvastatin 20 milliGRAM(s) Oral at bedtime  calcium carbonate 1250 mG + Vitamin D (OsCal 500 + D) 1 Tablet(s) Oral daily  ceFAZolin   IVPB 2000 milliGRAM(s) IV Intermittent every 8 hours  docusate sodium 100 milliGRAM(s) Oral three times a day  hydrochlorothiazide 25 milliGRAM(s) Oral daily  lactated ringers. 1000 milliLiter(s) (80 mL/Hr) IV Continuous <Continuous>  morphine  - Injectable 2 milliGRAM(s) IV Push once  pantoprazole    Tablet 40 milliGRAM(s) Oral before breakfast  polyethylene glycol 3350 17 Gram(s) Oral daily    MEDICATIONS  (PRN):  acetaminophen   Tablet 650 milliGRAM(s) Oral every 6 hours PRN For Temp over 38.3 C (100.94 F)  acetaminophen   Tablet. 650 milliGRAM(s) Oral every 6 hours PRN headache  aluminum hydroxide/magnesium hydroxide/simethicone Suspension 30 milliLiter(s) Oral four times a day PRN Indigestion  artificial  tears Solution 1 Drop(s) Both EYES five times a day PRN Dry Eyes  magnesium hydroxide Suspension 30 milliLiter(s) Oral daily PRN Constipation  metoclopramide Injectable 10 milliGRAM(s) IV Push every 6 hours PRN Nausea and/or Vomiting  morphine  - Injectable 2 milliGRAM(s) IV Push every 4 hours PRN Severe Pain  ondansetron Injectable 4 milliGRAM(s) IV Push every 4 hours PRN Nausea and/or Vomiting  oxyCODONE    IR 5 milliGRAM(s) Oral every 4 hours PRN Mild Pain  oxyCODONE    IR 10 milliGRAM(s) Oral every 4 hours PRN Moderate Pain  senna 2 Tablet(s) Oral at bedtime PRN Constipation      ALLERGIES:  Allergies    adhesives (Rash)  No Known Drug Allergies    Intolerances        LABS:                        11.1   7.9   )-----------( 405      ( 21 Dec 2017 06:51 )             34.6     12-    136  |  95<L>  |  16  ----------------------------<  169<H>  4.3   |  28  |  0.74    Ca    10.4      21 Dec 2017 06:57  Phos  2.8       Mg     1.6         TPro  7.2  /  Alb  4.3  /  TBili  0.2  /  DBili  x   /  AST  16  /  ALT  11  /  AlkPhos  114  -    LIVER FUNCTIONS - ( 19 Dec 2017 19:25 )  Alb: 4.3 g/dL / Pro: 7.2 g/dL / ALK PHOS: 114 U/L / ALT: 11 U/L / AST: 16 U/L / GGT: x           PT/INR - ( 19 Dec 2017 19:25 )   PT: 10.9 sec;   INR: 0.98          PTT - ( 19 Dec 2017 19:25 )  PTT:26.1 sec  Urinalysis Basic - ( 20 Dec 2017 06:18 )    Color: Yellow / Appearance: Clear / S.020 / pH: x  Gluc: x / Ketone: Trace mg/dL  / Bili: Negative / Urobili: 0.2 E.U./dL   Blood: x / Protein: NEGATIVE mg/dL / Nitrite: NEGATIVE   Leuk Esterase: Trace / RBC: < 5 /HPF / WBC < 5 /HPF   Sq Epi: x / Non Sq Epi: 5-10 /HPF / Bacteria: Present /HPF            RADIOLOGY & ADDITIONAL TESTS: Reviewed.

## 2017-12-21 NOTE — OCCUPATIONAL THERAPY INITIAL EVALUATION ADULT - LEVEL OF INDEPENDENCE: DRESS LOWER BODY, OT EVAL
donning/doffing left knee immobilizer, otherwise can use long handled equipment with modified independence/moderate assist (50% patients effort)

## 2017-12-21 NOTE — PHYSICAL THERAPY INITIAL EVALUATION ADULT - PERTINENT HX OF CURRENT PROBLEM, REHAB EVAL
Patient is a 72 year old F who presents R LUCIANA 11/8/17. Presents after she chased her cat after it jumped off the metal table at the vet and felt excruciating pain in her left hip. Presents for revision of left LUCIANA with Dr. Lema.

## 2017-12-21 NOTE — DISCHARGE NOTE ADULT - NS AS ACTIVITY OBS
Walking-Indoors allowed/Do not make important decisions/Walking-Outdoors allowed/Stairs allowed/Showering allowed/No Heavy lifting/straining/Do not drive or operate machinery

## 2017-12-21 NOTE — DISCHARGE NOTE ADULT - PATIENT PORTAL LINK FT
“You can access the [employer] Patient Portal, offered by [facility], by registering with the following website: [email or web address]”

## 2017-12-21 NOTE — OCCUPATIONAL THERAPY INITIAL EVALUATION ADULT - PERTINENT HX OF CURRENT PROBLEM, REHAB EVAL
Patient is 72 F with R LUCIANA 11/8/17. Come in after she felt a click in her L hip and has been having pain and unable to bear weight. S/P L THR closed reduction with posterior precautions 12/20.

## 2017-12-21 NOTE — PHYSICAL THERAPY INITIAL EVALUATION ADULT - GAIT DEVIATIONS NOTED, PT EVAL
decreased step length/circumduction on LLE to clear leg/decreased sallie/increased time in double stance

## 2017-12-21 NOTE — PROGRESS NOTE ADULT - ASSESSMENT
71 yo female with HTN, HLD, GERD, s/p left LUCIANA on 11/8/17, presented with complete posterosuperior dislocation, now s/p revision of left LUCIANA, POD#1.     # Revision of left LUCIANA, POD#1   - pain regimen and bowel regimen as per ortho  - encouraged use of incentive spirometry   - f/u PT and OT recs    # HTN   - overnight had one reading of 93/54, now improved to SBP 120s - may have been secondary to anesthesia effect   - patient reports her baseline is 140s when she doesn't take meds   - received HCTZ 25mg this morning - monitor BP and increase PO intake     # HLD   - c/w home lipitor 20mg qhs     # GERD   - c/w PPI     # DVT ppx - Aspirin 325mg BID   Dispo - pending PT recs  Patient to follow up with her PMD in [place] upon discharge.       Discussed with Dr Garcia and primary team.

## 2017-12-21 NOTE — PHYSICAL THERAPY INITIAL EVALUATION ADULT - CRITERIA FOR SKILLED THERAPEUTIC INTERVENTIONS
predicted duration of therapy intervention/anticipated equipment needs at discharge/anticipated discharge recommendation/functional limitations in following categories/rehab potential/therapy frequency/risk reduction/prevention/impairments found

## 2017-12-21 NOTE — PHYSICAL THERAPY INITIAL EVALUATION ADULT - RANGE OF MOTION EXAMINATION, REHAB EVAL
bilateral upper extremity ROM was WFL (within functional limits)/bilateral lower extremity ROM was WFL (within functional limits)/L knee not tested due to being in knee immobilizer

## 2017-12-21 NOTE — PHYSICAL THERAPY INITIAL EVALUATION ADULT - IMPAIRMENTS FOUND, PT EVAL
aerobic capacity/endurance/gait, locomotion, and balance/joint integrity and mobility/muscle strength/ROM

## 2017-12-21 NOTE — PROGRESS NOTE ADULT - SUBJECTIVE AND OBJECTIVE BOX
S: No acute events overnight. No chest pain or shortness of breath    Vital Signs Last 24 Hrs  T(C): 36.4 (21 Dec 2017 06:30), Max: 37.3 (20 Dec 2017 20:00)  T(F): 97.6 (21 Dec 2017 06:30), Max: 99.1 (20 Dec 2017 20:00)  HR: 55 (21 Dec 2017 06:30) (52 - 78)  BP: 117/68 (21 Dec 2017 06:30) (89/58 - 131/87)  BP(mean): 65 (21 Dec 2017 02:30) (65 - 82)  RR: 15 (21 Dec 2017 06:30) (10 - 24)  SpO2: 99% (21 Dec 2017 06:30) (94% - 99%)    I&O's Summary    19 Dec 2017 07:01  -  20 Dec 2017 07:00  --------------------------------------------------------  IN: 800 mL / OUT: 900 mL / NET: -100 mL    20 Dec 2017 07:01  -  21 Dec 2017 06:33  --------------------------------------------------------  IN: 555 mL / OUT: 1600 mL / NET: -1045 mL        Dressing CDI (Prevena) L hip  Motor: 5/5 GS/TA/EHL/FHL    Sensation: SILT sural, saph, DP, SP and tibial n distribution  Pulses: WWP, DP/PT 2+                            11.2   10.1  )-----------( 384      ( 21 Dec 2017 02:02 )             34.5     12-21    136  |  95<L>  |  15  ----------------------------<  170<H>  3.1<L>   |  28  |  0.78    Ca    9.7      21 Dec 2017 01:58  Phos  2.8     12-21  Mg     1.6     12-21    TPro  7.2  /  Alb  4.3  /  TBili  0.2  /  DBili  x   /  AST  16  /  ALT  11  /  AlkPhos  114  12-19      MEDICATIONS  (STANDING):  aspirin enteric coated 325 milliGRAM(s) Oral two times a day  atorvastatin 20 milliGRAM(s) Oral at bedtime  calcium carbonate 1250 mG + Vitamin D (OsCal 500 + D) 1 Tablet(s) Oral daily  ceFAZolin   IVPB 2000 milliGRAM(s) IV Intermittent every 8 hours  docusate sodium 100 milliGRAM(s) Oral three times a day  hydrochlorothiazide 25 milliGRAM(s) Oral daily  lactated ringers. 1000 milliLiter(s) (80 mL/Hr) IV Continuous <Continuous>  morphine  - Injectable 2 milliGRAM(s) IV Push once  pantoprazole    Tablet 40 milliGRAM(s) Oral before breakfast  polyethylene glycol 3350 17 Gram(s) Oral daily    MEDICATIONS  (PRN):  acetaminophen   Tablet 650 milliGRAM(s) Oral every 6 hours PRN For Temp over 38.3 C (100.94 F)  acetaminophen   Tablet. 650 milliGRAM(s) Oral every 6 hours PRN headache  aluminum hydroxide/magnesium hydroxide/simethicone Suspension 30 milliLiter(s) Oral four times a day PRN Indigestion  artificial  tears Solution 1 Drop(s) Both EYES five times a day PRN Dry Eyes  magnesium hydroxide Suspension 30 milliLiter(s) Oral daily PRN Constipation  metoclopramide Injectable 10 milliGRAM(s) IV Push every 6 hours PRN Nausea and/or Vomiting  morphine  - Injectable 2 milliGRAM(s) IV Push every 4 hours PRN Severe Pain  ondansetron Injectable 4 milliGRAM(s) IV Push every 4 hours PRN Nausea and/or Vomiting  oxyCODONE    IR 5 milliGRAM(s) Oral every 4 hours PRN Mild Pain  oxyCODONE    IR 10 milliGRAM(s) Oral every 4 hours PRN Moderate Pain  senna 2 Tablet(s) Oral at bedtime PRN Constipation      A/P:  72y Female s/p revision L LUCIANA on 10/20/17  -Stable  -Pain Control  -Post hip precautions  -Abduction pillow in place  -PT/WBAT  -DVT ppx: ASA  -Advance diet as tolerated  -f/u AM labs  -Dispo: Pending

## 2017-12-21 NOTE — DISCHARGE NOTE ADULT - MEDICATION SUMMARY - MEDICATIONS TO TAKE
I will START or STAY ON the medications listed below when I get home from the hospital:    acetaminophen 325 mg oral tablet  -- 2 tab(s) by mouth every 6 hours, As needed, For Temp over 38.3 C (100.94 F)  -- Indication: For fever    meloxicam 15 mg oral tablet  -- 1 tab(s) by mouth once a day   -- Do not take this drug if you are pregnant.  Obtain medical advice before taking any non-prescription drugs as some may affect the action of this medication.  Take with food or milk.    -- Indication: For antiinflammatory/pain control **Take after breakfast**    oxyCODONE-acetaminophen 5 mg-325 mg oral tablet  -- 1 to 2  tab(s) by mouth every 4 to 6 hours, as needed, pain MDD:6    -- Caution federal law prohibits the transfer of this drug to any person other  than the person for whom it was prescribed.  May cause drowsiness.  Alcohol may intensify this effect.  Use care when operating dangerous machinery.  This prescription cannot be refilled.  This product contains acetaminophen.  Do not use  with any other product containing acetaminophen to prevent possible liver damage.  Using more of this medication than prescribed may cause serious breathing problems.    -- Indication: For moderate to severe pain     Ecotrin 325 mg oral delayed release tablet  -- 1 tab(s) by mouth 2 times a day   -- Swallow whole.  Do not crush.  Take with food or milk.    -- Indication: For Prevent blood clots    Lipitor 20 mg oral tablet  -- 1 tab(s) by mouth once a day  -- Indication: For hyperlipidemia    hydroCHLOROthiazide 25 mg oral tablet  -- 1 tab(s) by mouth once a day  -- Indication: For home diuretic    docusate sodium 100 mg oral capsule  -- 1 cap(s) by mouth 3 times a day  -- Indication: For constipation    bisacodyl 10 mg rectal suppository  -- 1 suppository(ies) rectally once a day, As needed, If no bowel movement by postoperative day #2  -- Indication: For constipation    polyethylene glycol 3350 oral powder for reconstitution  -- 17 gram(s) by mouth once a day  -- Indication: For constipation    senna oral tablet  -- 2 tab(s) by mouth once a day (at bedtime), As needed, Constipation  -- Indication: For constipation    lactobacillus acidophilus oral capsule  -- 1 tablet by mouth two times daily   -- Indication: For Probiotics    NexIUM OTC 20 mg oral delayed release capsule  -- 1 cap(s) by mouth once a day  -- Indication: For home PPI    ciprofloxacin 500 mg oral tablet  -- 1 tab(s) by mouth every 12 hours   -- Avoid prolonged or excessive exposure to direct and/or artificial sunlight while taking this medication.  Check with your doctor before becoming pregnant.  Do not take dairy products, antacids, or iron preparations within one hour of this medication.  Finish all this medication unless otherwise directed by prescriber.  Medication should be taken with plenty of water.    -- Indication: For Prevent urinary tract infection    calcium (as carbonate)-vitamin D 250 mg-125 intl units oral tablet  -- 1 tab(s) by mouth once a day  -- Indication: For supplement

## 2017-12-21 NOTE — DISCHARGE NOTE ADULT - CARE PLAN
Principal Discharge DX:	Pain of left hip joint  Goal:	improvement s/p Revision Left THR  Instructions for follow-up, activity and diet:	see below

## 2017-12-21 NOTE — PROGRESS NOTE ADULT - SUBJECTIVE AND OBJECTIVE BOX
ORTHO NOTE    [x ] Pt seen/examined.  [x ] Pt without any complaints/in NAD.    [ ] Pt complains of:      ROS: [ ] Fever  [ ] Chills  [ ] CP [ ] SOB [ ] Dysnea  [ ] Palpitations [ ] Cough [ ] N/V/C/D [ ] Paresthia [ ] Other      [x] ROS  otherwise negative    .    PHYSICAL EXAM:    Vital Signs Last 24 Hrs  T(C): 36.4 (21 Dec 2017 15:23), Max: 37.3 (20 Dec 2017 20:00)  T(F): 97.5 (21 Dec 2017 15:23), Max: 99.1 (20 Dec 2017 20:00)  HR: 75 (21 Dec 2017 15:23) (52 - 78)  BP: 128/59 (21 Dec 2017 15:23) (89/58 - 131/77)  BP(mean): 65 (21 Dec 2017 02:30) (65 - 82)  RR: 18 (21 Dec 2017 15:23) (10 - 24)  SpO2: 96% (21 Dec 2017 15:23) (94% - 99%)    I&O's Detail    20 Dec 2017 07:01  -  21 Dec 2017 07:00  --------------------------------------------------------  IN:    IV PiggyBack: 100 mL    lactated ringers.: 80 mL    lactated ringers.: 375 mL  Total IN: 555 mL    OUT:    Voided: 1600 mL  Total OUT: 1600 mL    Total NET: -1045 mL      21 Dec 2017 07:01  -  21 Dec 2017 15:31  --------------------------------------------------------  IN:    Oral Fluid: 660 mL  Total IN: 660 mL    OUT:    Voided: 1150 mL  Total OUT: 1150 mL    Total NET: -490 mL           CAPILLARY BLOOD GLUCOSE                      Neuro: AAOx3, pleasant sitting up in bed.     Lungs: CTA, IS demonstrated     CV:    ABD: soft, non tender     Ext: L hip prevena to suction CDI, LLE NVID, motor 5/5, knee immobilizer to LLE, abductor pillow implemented     LABS                        11.1   7.9   )-----------( 405      ( 21 Dec 2017 06:51 )             34.6                              PT/INR - ( 19 Dec 2017 19:25 )   PT: 10.9 sec;   INR: 0.98          PTT - ( 19 Dec 2017 19:25 )  PTT:26.1 sec  12-21    136  |  95<L>  |  16  ----------------------------<  169<H>  4.3   |  28  |  0.74    Ca    10.4      21 Dec 2017 06:57  Phos  2.8     12-21  Mg     1.6     12-21    TPro  7.2  /  Alb  4.3  /  TBili  0.2  /  DBili  x   /  AST  16  /  ALT  11  /  AlkPhos  114  12-19      [ ] Other Labs  [ ] None ordered            Please check or [place] when present:  •  Heart Failure:    [ ] Acute        [ ]  Acute on Chronic        [ ] Chronic         [ ] Diastolic     [ ]  Combined    •  PAULA:     [ ] ATN        [ ]  Renal medullary necrosis       [ ]  Renal cortical necrosis                  [ ] Other pathological Lesion:  •  CKD:  [ ] Stage I   [ ] Stage II  [ ] Stage III    [ ]Stage IV   [ ]  CKD V   [ ]  Other/Unspecified:    •  Abdominal Nutritional Status:   [ ] Malnutrition-See Nutrition note    [ ] Cachexia   [ ]  Other        [ ] Supplement ordered:            [ ] Morbid Obesity: BMI >=40         ASSESSMENT/PLAN:      STATUS POST: L LUCIANA (revision)  POD#1  per Dr. Lema posterior hip precautions d/t type of dislocation, abductor pillow implemented   H/H stable  pain controlled     CONTINUE:          [ ] PT- WBAT with KI to LLE, posterior hip precautions      [ ] DVT PPX- ASA SCD to RLE    [ ] Pain Mgt- PO meds     [ ] Dispo plan- home with hPT

## 2017-12-21 NOTE — OCCUPATIONAL THERAPY INITIAL EVALUATION ADULT - RANGE OF MOTION EXAMINATION, UPPER EXTREMITY
Right UE Active ROM was WFL (within functional limits)/Right UE Passive ROM was WFL  (within functional limits)/left shoulder flexion/abduction 90 degrees limited at baseline

## 2017-12-21 NOTE — DISCHARGE NOTE ADULT - HOSPITAL COURSE
Admitted  Surgery  Ramonita-op Antibiotics  Pain control  DVT prophylaxis  OOB/Physical Therapy Admitted 12/19/17 L hip dislocation  Surgery 12/19/17- left hip replacement revision  Ramonita-op Antibiotics  Pain control  DVT prophylaxis  OOB/Physical Therapy/OT- posterior hip precautions

## 2017-12-21 NOTE — PHYSICAL THERAPY INITIAL EVALUATION ADULT - ADDITIONAL COMMENTS
Patient lives with her spouse in a private house with no steps to enter, however with 14 steps to reach second floor where her bedroom is. no basement. Prior to admission, patient was independent for all functional mobility without assistive device. Has rolling walker and straight cane from prior surgery in November

## 2017-12-21 NOTE — DISCHARGE NOTE ADULT - ADDITIONAL INSTRUCTIONS
No strenuous activity, heavy lifting, driving or returning to work until cleared by MD.  You may shower - dressing is water-resistant, no soaking in bathtubs.  Remove dressing after post op day 5-7, then leave incision open to air. Keep incision clean and dry.  Try to have regular bowel movements, take stool softener or laxative if necessary.  May take Pepcid or Zantac for upset stomach.  May take Aleve or Naproxen instead of Meloxicam/Celebrex.  Swelling may travel all the way down leg to foot, this is normal and will subside in a few weeks.  Call to schedule an appt with Dr. Lema for follow up, if you have staples or sutures they will be removed in office.  Contact your doctor if you experience: fever greater than 101.5, chills, chest pain, difficulty breathing, redness or excessive drainage around the incision, other concerns.  Follow up with your primary care provider. Weight bearing as tolerated on left leg with knee immobilizer using walker.  Posterior hip precautions-- keep abductor pillow between legs to prevent hip dislocation.  Patient dislocated posteriorally so even though incision is anterior Dr. Lema wants posterior precautions.  You have a prevena incisional drain to promote wound closure. Keep device on until it quigley down.  Then remove and throw dressing in the garbage and keep incision open to air.  No strenuous activity, heavy lifting, driving or returning to work until cleared by MD.  You may shower - dressing is water-resistant, no soaking in bathtubs.  Try to have regular bowel movements, take stool softener or laxative if necessary.  May take Pepcid or Zantac for upset stomach.  May take Aleve or Naproxen instead of Meloxicam.  Swelling may travel all the way down leg to foot, this is normal and will subside in a few weeks.  Call to schedule an appt with Dr. Lema for follow up appointment.  Contact your doctor if you experience: fever greater than 101.5, chills, chest pain, difficulty breathing, redness or excessive drainage around the incision, other concerns.  Follow up with your primary care provider.

## 2017-12-21 NOTE — DISCHARGE NOTE ADULT - CARE PROVIDER_API CALL
Andrés Lema), Orthopaedic Surgery Surgery  [address] [place] [address]  Phone: [phone]  Fax: [phone]

## 2017-12-22 VITALS — WEIGHT: 125.22 LBS

## 2017-12-22 LAB
-  AMPICILLIN/SULBACTAM: SIGNIFICANT CHANGE UP
-  AMPICILLIN: SIGNIFICANT CHANGE UP
-  CEFAZOLIN: SIGNIFICANT CHANGE UP
-  CEFTRIAXONE: SIGNIFICANT CHANGE UP
-  CIPROFLOXACIN: SIGNIFICANT CHANGE UP
-  GENTAMICIN: SIGNIFICANT CHANGE UP
-  NITROFURANTOIN: SIGNIFICANT CHANGE UP
-  PIPERACILLIN/TAZOBACTAM: SIGNIFICANT CHANGE UP
-  TOBRAMYCIN: SIGNIFICANT CHANGE UP
-  TRIMETHOPRIM/SULFAMETHOXAZOLE: SIGNIFICANT CHANGE UP
CULTURE RESULTS: SIGNIFICANT CHANGE UP
METHOD TYPE: SIGNIFICANT CHANGE UP
ORGANISM # SPEC MICROSCOPIC CNT: SIGNIFICANT CHANGE UP
ORGANISM # SPEC MICROSCOPIC CNT: SIGNIFICANT CHANGE UP
SPECIMEN SOURCE: SIGNIFICANT CHANGE UP
SURGICAL PATHOLOGY STUDY: SIGNIFICANT CHANGE UP

## 2017-12-22 PROCEDURE — 72170 X-RAY EXAM OF PELVIS: CPT

## 2017-12-22 PROCEDURE — 86901 BLOOD TYPING SEROLOGIC RH(D): CPT

## 2017-12-22 PROCEDURE — 97161 PT EVAL LOW COMPLEX 20 MIN: CPT

## 2017-12-22 PROCEDURE — 99285 EMERGENCY DEPT VISIT HI MDM: CPT | Mod: 25

## 2017-12-22 PROCEDURE — 84100 ASSAY OF PHOSPHORUS: CPT

## 2017-12-22 PROCEDURE — 86850 RBC ANTIBODY SCREEN: CPT

## 2017-12-22 PROCEDURE — 85610 PROTHROMBIN TIME: CPT

## 2017-12-22 PROCEDURE — 93005 ELECTROCARDIOGRAM TRACING: CPT

## 2017-12-22 PROCEDURE — 85025 COMPLETE CBC W/AUTO DIFF WBC: CPT

## 2017-12-22 PROCEDURE — 73501 X-RAY EXAM HIP UNI 1 VIEW: CPT

## 2017-12-22 PROCEDURE — 81001 URINALYSIS AUTO W/SCOPE: CPT

## 2017-12-22 PROCEDURE — 80048 BASIC METABOLIC PNL TOTAL CA: CPT

## 2017-12-22 PROCEDURE — 96375 TX/PRO/DX INJ NEW DRUG ADDON: CPT

## 2017-12-22 PROCEDURE — 83735 ASSAY OF MAGNESIUM: CPT

## 2017-12-22 PROCEDURE — 97116 GAIT TRAINING THERAPY: CPT

## 2017-12-22 PROCEDURE — 80053 COMPREHEN METABOLIC PANEL: CPT

## 2017-12-22 PROCEDURE — 85730 THROMBOPLASTIN TIME PARTIAL: CPT

## 2017-12-22 PROCEDURE — C1713: CPT

## 2017-12-22 PROCEDURE — 73700 CT LOWER EXTREMITY W/O DYE: CPT

## 2017-12-22 PROCEDURE — 36415 COLL VENOUS BLD VENIPUNCTURE: CPT

## 2017-12-22 PROCEDURE — 88300 SURGICAL PATH GROSS: CPT

## 2017-12-22 PROCEDURE — 87075 CULTR BACTERIA EXCEPT BLOOD: CPT

## 2017-12-22 PROCEDURE — 73502 X-RAY EXAM HIP UNI 2-3 VIEWS: CPT

## 2017-12-22 PROCEDURE — 87186 SC STD MICRODIL/AGAR DIL: CPT

## 2017-12-22 PROCEDURE — C1776: CPT

## 2017-12-22 PROCEDURE — 76000 FLUOROSCOPY <1 HR PHYS/QHP: CPT

## 2017-12-22 PROCEDURE — 87086 URINE CULTURE/COLONY COUNT: CPT

## 2017-12-22 PROCEDURE — 87070 CULTURE OTHR SPECIMN AEROBIC: CPT

## 2017-12-22 PROCEDURE — 85027 COMPLETE CBC AUTOMATED: CPT

## 2017-12-22 PROCEDURE — 99233 SBSQ HOSP IP/OBS HIGH 50: CPT | Mod: GC

## 2017-12-22 PROCEDURE — 86900 BLOOD TYPING SEROLOGIC ABO: CPT

## 2017-12-22 PROCEDURE — 71045 X-RAY EXAM CHEST 1 VIEW: CPT

## 2017-12-22 PROCEDURE — 96374 THER/PROPH/DIAG INJ IV PUSH: CPT

## 2017-12-22 PROCEDURE — C1889: CPT

## 2017-12-22 RX ORDER — LACTOBACILLUS ACIDOPHILUS 100MM CELL
0 CAPSULE ORAL
Qty: 0 | Refills: 0 | COMMUNITY
Start: 2017-12-22

## 2017-12-22 RX ORDER — SENNA PLUS 8.6 MG/1
2 TABLET ORAL
Qty: 0 | Refills: 0 | COMMUNITY
Start: 2017-12-22

## 2017-12-22 RX ORDER — ASPIRIN/CALCIUM CARB/MAGNESIUM 324 MG
1 TABLET ORAL
Qty: 60 | Refills: 0 | OUTPATIENT
Start: 2017-12-22 | End: 2018-01-20

## 2017-12-22 RX ORDER — ACETAMINOPHEN 500 MG
2 TABLET ORAL
Qty: 0 | Refills: 0 | COMMUNITY
Start: 2017-12-22

## 2017-12-22 RX ORDER — MELOXICAM 15 MG/1
1 TABLET ORAL
Qty: 30 | Refills: 0 | OUTPATIENT
Start: 2017-12-22 | End: 2018-01-20

## 2017-12-22 RX ORDER — CIPROFLOXACIN LACTATE 400MG/40ML
1 VIAL (ML) INTRAVENOUS
Qty: 14 | Refills: 0 | OUTPATIENT
Start: 2017-12-22 | End: 2017-12-28

## 2017-12-22 RX ORDER — POLYETHYLENE GLYCOL 3350 17 G/17G
17 POWDER, FOR SOLUTION ORAL
Qty: 0 | Refills: 0 | COMMUNITY
Start: 2017-12-22

## 2017-12-22 RX ADMIN — OXYCODONE HYDROCHLORIDE 5 MILLIGRAM(S): 5 TABLET ORAL at 16:01

## 2017-12-22 RX ADMIN — OXYCODONE HYDROCHLORIDE 5 MILLIGRAM(S): 5 TABLET ORAL at 07:28

## 2017-12-22 RX ADMIN — OXYCODONE HYDROCHLORIDE 5 MILLIGRAM(S): 5 TABLET ORAL at 16:20

## 2017-12-22 RX ADMIN — PANTOPRAZOLE SODIUM 40 MILLIGRAM(S): 20 TABLET, DELAYED RELEASE ORAL at 06:23

## 2017-12-22 RX ADMIN — Medication 500 MILLIGRAM(S): at 06:23

## 2017-12-22 RX ADMIN — Medication 100 MILLIGRAM(S): at 05:25

## 2017-12-22 RX ADMIN — OXYCODONE HYDROCHLORIDE 5 MILLIGRAM(S): 5 TABLET ORAL at 12:30

## 2017-12-22 RX ADMIN — Medication 325 MILLIGRAM(S): at 05:26

## 2017-12-22 RX ADMIN — OXYCODONE HYDROCHLORIDE 5 MILLIGRAM(S): 5 TABLET ORAL at 12:05

## 2017-12-22 RX ADMIN — Medication 100 MILLIGRAM(S): at 13:31

## 2017-12-22 RX ADMIN — OXYCODONE HYDROCHLORIDE 5 MILLIGRAM(S): 5 TABLET ORAL at 08:00

## 2017-12-22 NOTE — PROGRESS NOTE ADULT - ASSESSMENT
71 yo female with HTN, HLD, GERD, s/p left LUCIANA on 11/8/17, presented with complete posterosuperior dislocation, now s/p revision of left LUCIANA, POD#2.     # Revision of left LUCIANA, POD#2   - pain regimen and bowel regimen as per ortho  - encouraged use of incentive spirometry   - PT recommends home with home PT    # Urine Culture with 80K Colonies of EColi  - patient asymptomatic, U/A negative (<5 WBCs), and Urine Culture not fully positive  - although started on cipro 500mg bid, counseled patient that she does not have true UTI and explained risks vs benefits including antibiotic resistance - however patient wishes to continue 3 days of antibiotics for suspected UTI    # HTN - SBP improved today to 120s  - patient reports her baseline is 140s when she doesn't take meds   - c/w HCTZ 25mg this morning     # HLD   - c/w home lipitor 20mg qhs     # GERD   - c/w PPI     # DVT ppx - Aspirin 325mg BID   Dispo - home with home PT today.   Patient to follow up with her PMD in [place] upon discharge.       Discussed with Dr Garcia and primary team.

## 2017-12-22 NOTE — PROGRESS NOTE ADULT - SUBJECTIVE AND OBJECTIVE BOX
Medicine Follow Up    INTERVAL HPI/OVERNIGHT EVENTS:    SUBJECTIVE: Patient seen and examined at bedside.    OBJECTIVE:    VITAL SIGNS:  ICU Vital Signs Last 24 Hrs  T(C): 36.7 (22 Dec 2017 08:38), Max: 37.3 (22 Dec 2017 05:22)  T(F): 98 (22 Dec 2017 08:38), Max: 99.2 (22 Dec 2017 05:22)  HR: 90 (22 Dec 2017 08:38) (59 - 92)  BP: 129/68 (22 Dec 2017 08:38) (95/55 - 143/70)  BP(mean): --  ABP: --  ABP(mean): --  RR: 15 (22 Dec 2017 08:38) (15 - 18)  SpO2: 99% (22 Dec 2017 08:38) (94% - 100%)        12-21 @ 07:01  -  12-22 @ 07:00  --------------------------------------------------------  IN: 660 mL / OUT: 1550 mL / NET: -890 mL      CAPILLARY BLOOD GLUCOSE          PHYSICAL EXAM:  General: WDWN ; NAD  HEENT: PERRL, anicteric sclera  Neck: supple, no JVD  Respiratory: CTA B/L; no W/R/R  Cardiovascular: +S1/S2; RRR; no M/R/G  Gastrointestinal: soft, NT/ND; +BS x4  Extremities: WWP; 2+ peripheral pulses B/L; left leg in knee immobilizer distal pulses intact with good sensation   Skin: normal color and turgor; no rash      MEDICATIONS:  MEDICATIONS  (STANDING):  aspirin enteric coated 325 milliGRAM(s) Oral two times a day  atorvastatin 20 milliGRAM(s) Oral at bedtime  calcium carbonate 1250 mG + Vitamin D (OsCal 500 + D) 1 Tablet(s) Oral daily  ceFAZolin   IVPB 2000 milliGRAM(s) IV Intermittent every 8 hours  ciprofloxacin     Tablet 500 milliGRAM(s) Oral every 12 hours  docusate sodium 100 milliGRAM(s) Oral three times a day  hydrochlorothiazide 25 milliGRAM(s) Oral daily  lactated ringers. 1000 milliLiter(s) (80 mL/Hr) IV Continuous <Continuous>  lactobacillus acidophilus 1 Tablet(s) Oral daily  morphine  - Injectable 2 milliGRAM(s) IV Push once  pantoprazole    Tablet 40 milliGRAM(s) Oral before breakfast  polyethylene glycol 3350 17 Gram(s) Oral daily    MEDICATIONS  (PRN):  acetaminophen   Tablet 650 milliGRAM(s) Oral every 6 hours PRN For Temp over 38.3 C (100.94 F)  acetaminophen   Tablet. 650 milliGRAM(s) Oral every 6 hours PRN headache  aluminum hydroxide/magnesium hydroxide/simethicone Suspension 30 milliLiter(s) Oral four times a day PRN Indigestion  artificial  tears Solution 1 Drop(s) Both EYES five times a day PRN Dry Eyes  magnesium hydroxide Suspension 30 milliLiter(s) Oral daily PRN Constipation  metoclopramide Injectable 10 milliGRAM(s) IV Push every 6 hours PRN Nausea and/or Vomiting  morphine  - Injectable 2 milliGRAM(s) IV Push every 4 hours PRN Severe Pain  ondansetron Injectable 4 milliGRAM(s) IV Push every 4 hours PRN Nausea and/or Vomiting  oxyCODONE    IR 5 milliGRAM(s) Oral every 4 hours PRN Mild Pain  oxyCODONE    IR 10 milliGRAM(s) Oral every 4 hours PRN Moderate Pain  senna 2 Tablet(s) Oral at bedtime PRN Constipation      ALLERGIES:  Allergies    adhesives (Rash)  No Known Drug Allergies    Intolerances        LABS:                        11.1   7.9   )-----------( 405      ( 21 Dec 2017 06:51 )             34.6     12-21    136  |  95<L>  |  16  ----------------------------<  169<H>  4.3   |  28  |  0.74    Ca    10.4      21 Dec 2017 06:57  Phos  2.8     12-21  Mg     1.6     12-21                  RADIOLOGY & ADDITIONAL TESTS: Reviewed. Medicine Follow Up    INTERVAL HPI/OVERNIGHT EVENTS: Urine culture growing 80K colonies of EColi. Started on cipro.     SUBJECTIVE: Patient seen and examined at bedside. Reports feeling better today although feels more pain. Has been moving around and tolerating PO. Have regular BMs. Denies any changes in urinary habits including dysuria, hesitancy, frequency, or change in color. Requesting she wants to continue with antibiotics due to previous history of staph bacteremia and is concerned about getting infected. Denies fever, chills, n/v, headache, dyspnea, cough, abdominal pain.     OBJECTIVE:    VITAL SIGNS:  ICU Vital Signs Last 24 Hrs  T(C): 36.7 (22 Dec 2017 08:38), Max: 37.3 (22 Dec 2017 05:22)  T(F): 98 (22 Dec 2017 08:38), Max: 99.2 (22 Dec 2017 05:22)  HR: 90 (22 Dec 2017 08:38) (59 - 92)  BP: 129/68 (22 Dec 2017 08:38) (95/55 - 143/70)  RR: 15 (22 Dec 2017 08:38) (15 - 18)  SpO2: 99% (22 Dec 2017 08:38) (94% - 100%)        12-21 @ 07:01  -  12-22 @ 07:00  --------------------------------------------------------  IN: 660 mL / OUT: 1550 mL / NET: -890 mL      CAPILLARY BLOOD GLUCOSE          PHYSICAL EXAM:  General: WDWN ; NAD  HEENT: PERRL, anicteric sclera  Neck: supple, no JVD  Respiratory: CTA B/L; no W/R/R  Cardiovascular: +S1/S2; RRR; no M/R/G  Gastrointestinal: soft, NT/ND; +BS x4  Extremities: WWP; 2+ peripheral pulses B/L; left leg in knee immobilizer distal pulses intact with good sensation   Skin: normal color and turgor; no rash      MEDICATIONS:  MEDICATIONS  (STANDING):  aspirin enteric coated 325 milliGRAM(s) Oral two times a day  atorvastatin 20 milliGRAM(s) Oral at bedtime  calcium carbonate 1250 mG + Vitamin D (OsCal 500 + D) 1 Tablet(s) Oral daily  ceFAZolin   IVPB 2000 milliGRAM(s) IV Intermittent every 8 hours  ciprofloxacin     Tablet 500 milliGRAM(s) Oral every 12 hours  docusate sodium 100 milliGRAM(s) Oral three times a day  hydrochlorothiazide 25 milliGRAM(s) Oral daily  lactated ringers. 1000 milliLiter(s) (80 mL/Hr) IV Continuous <Continuous>  lactobacillus acidophilus 1 Tablet(s) Oral daily  morphine  - Injectable 2 milliGRAM(s) IV Push once  pantoprazole    Tablet 40 milliGRAM(s) Oral before breakfast  polyethylene glycol 3350 17 Gram(s) Oral daily    MEDICATIONS  (PRN):  acetaminophen   Tablet 650 milliGRAM(s) Oral every 6 hours PRN For Temp over 38.3 C (100.94 F)  acetaminophen   Tablet. 650 milliGRAM(s) Oral every 6 hours PRN headache  aluminum hydroxide/magnesium hydroxide/simethicone Suspension 30 milliLiter(s) Oral four times a day PRN Indigestion  artificial  tears Solution 1 Drop(s) Both EYES five times a day PRN Dry Eyes  magnesium hydroxide Suspension 30 milliLiter(s) Oral daily PRN Constipation  metoclopramide Injectable 10 milliGRAM(s) IV Push every 6 hours PRN Nausea and/or Vomiting  morphine  - Injectable 2 milliGRAM(s) IV Push every 4 hours PRN Severe Pain  ondansetron Injectable 4 milliGRAM(s) IV Push every 4 hours PRN Nausea and/or Vomiting  oxyCODONE    IR 5 milliGRAM(s) Oral every 4 hours PRN Mild Pain  oxyCODONE    IR 10 milliGRAM(s) Oral every 4 hours PRN Moderate Pain  senna 2 Tablet(s) Oral at bedtime PRN Constipation      ALLERGIES:  Allergies    adhesives (Rash)  No Known Drug Allergies    Intolerances        LABS:                        11.1   7.9   )-----------( 405      ( 21 Dec 2017 06:51 )             34.6     12-21    136  |  95<L>  |  16  ----------------------------<  169<H>  4.3   |  28  |  0.74    Ca    10.4      21 Dec 2017 06:57  Phos  2.8     12-21  Mg     1.6     12-21                  RADIOLOGY & ADDITIONAL TESTS: Reviewed.

## 2017-12-22 NOTE — PROGRESS NOTE ADULT - SUBJECTIVE AND OBJECTIVE BOX
SUBJECTIVE: Patient seen and examined.  No issues overnight. Pain well controlled. Denies CP/SOB    OBJECTIVE:     Vital Signs Last 24 Hrs  T(C): 37.3 (22 Dec 2017 05:22), Max: 37.3 (22 Dec 2017 05:22)  T(F): 99.2 (22 Dec 2017 05:22), Max: 99.2 (22 Dec 2017 05:22)  HR: 59 (22 Dec 2017 05:22) (59 - 75)  BP: 95/55 (22 Dec 2017 05:22) (95/55 - 128/62)  BP(mean): --  RR: 15 (22 Dec 2017 05:22) (15 - 18)  SpO2: 94% (22 Dec 2017 05:22) (94% - 98%)                      Dressing: clean/dry/intact            Motor exam:  TA 5/5 EHL 5/5 GSC 5/5          Sensation:   T SILT SPH SILT DP SILT         Vascular:  Warm/pink/well perfused             LABS:                        11.1   7.9   )-----------( 405      ( 21 Dec 2017 06:51 )             34.6     12-21    136  |  95<L>  |  16  ----------------------------<  169<H>  4.3   |  28  |  0.74    Ca    10.4      21 Dec 2017 06:57  Phos  2.8     12-21  Mg     1.6     12-21            MEDICATIONS:  MEDICATIONS  (STANDING):  aspirin enteric coated 325 milliGRAM(s) Oral two times a day  atorvastatin 20 milliGRAM(s) Oral at bedtime  calcium carbonate 1250 mG + Vitamin D (OsCal 500 + D) 1 Tablet(s) Oral daily  ceFAZolin   IVPB 2000 milliGRAM(s) IV Intermittent every 8 hours  ciprofloxacin     Tablet 500 milliGRAM(s) Oral every 12 hours  docusate sodium 100 milliGRAM(s) Oral three times a day  hydrochlorothiazide 25 milliGRAM(s) Oral daily  lactated ringers. 1000 milliLiter(s) (80 mL/Hr) IV Continuous <Continuous>  lactobacillus acidophilus 1 Tablet(s) Oral daily  morphine  - Injectable 2 milliGRAM(s) IV Push once  pantoprazole    Tablet 40 milliGRAM(s) Oral before breakfast  polyethylene glycol 3350 17 Gram(s) Oral daily    MEDICATIONS  (PRN):  acetaminophen   Tablet 650 milliGRAM(s) Oral every 6 hours PRN For Temp over 38.3 C (100.94 F)  acetaminophen   Tablet. 650 milliGRAM(s) Oral every 6 hours PRN headache  aluminum hydroxide/magnesium hydroxide/simethicone Suspension 30 milliLiter(s) Oral four times a day PRN Indigestion  artificial  tears Solution 1 Drop(s) Both EYES five times a day PRN Dry Eyes  magnesium hydroxide Suspension 30 milliLiter(s) Oral daily PRN Constipation  metoclopramide Injectable 10 milliGRAM(s) IV Push every 6 hours PRN Nausea and/or Vomiting  morphine  - Injectable 2 milliGRAM(s) IV Push every 4 hours PRN Severe Pain  ondansetron Injectable 4 milliGRAM(s) IV Push every 4 hours PRN Nausea and/or Vomiting  oxyCODONE    IR 5 milliGRAM(s) Oral every 4 hours PRN Mild Pain  oxyCODONE    IR 10 milliGRAM(s) Oral every 4 hours PRN Moderate Pain  senna 2 Tablet(s) Oral at bedtime PRN Constipation      Anticoagulation:  aspirin enteric coated 325 milliGRAM(s) Oral two times a day      Antibiotics:   ceFAZolin   IVPB 2000 milliGRAM(s) IV Intermittent every 8 hours  ciprofloxacin     Tablet 500 milliGRAM(s) Oral every 12 hours      Pain medications:   acetaminophen   Tablet 650 milliGRAM(s) Oral every 6 hours PRN  acetaminophen   Tablet. 650 milliGRAM(s) Oral every 6 hours PRN  metoclopramide Injectable 10 milliGRAM(s) IV Push every 6 hours PRN  morphine  - Injectable 2 milliGRAM(s) IV Push every 4 hours PRN  morphine  - Injectable 2 milliGRAM(s) IV Push once  ondansetron Injectable 4 milliGRAM(s) IV Push every 4 hours PRN  oxyCODONE    IR 5 milliGRAM(s) Oral every 4 hours PRN  oxyCODONE    IR 10 milliGRAM(s) Oral every 4 hours PRN        A/P :   72f s/p   l bobby revision  -    Pain control  -    DVT ppx: aspirin enteric coated 325 milliGRAM(s) Oral two times a day  -    ADAT  -    Check AM labs  -    Weight bearing status:  wbat  -    Resume home meds  -    Physical Therapy  -    posterior hip precautions, knee immobilizer

## 2017-12-22 NOTE — DIETITIAN INITIAL EVALUATION ADULT. - ENERGY NEEDS
IBW 56.8Kg  %%  BMI 29.1    Utilized IBW to calculate needs, pt >120% of IBW. Adjusted for age/post-op.

## 2017-12-22 NOTE — DIETITIAN INITIAL EVALUATION ADULT. - OTHER INFO
73y/o F s/p L LUCIANA revision. Pt seen asleep in bed; unable to arouse via verbal stimuli. Breakfast tray noted at bedside untouched. Per RN, pt normally with a good appetite. No apparent GI distress and mechanical issues noted. Unsure of diet/wt changes at present. RD to follow per policy.

## 2017-12-22 NOTE — PROGRESS NOTE ADULT - ATTENDING COMMENTS
Patient seen/ examined  Discussed surgery in detail   Continue posterior hip precautions  Continue Abx  GI prophylaxis  Will follow dispo
Correction made: Troey instead of Martine
Seen and examined by me this afternoon. Doing well post-op. Hypotensive early AM but BP improved afterwards, tolerating PO. Pain under control. C/w pain meds, bowel regimen. Enc IS use. C/w HCTZ for HTN. C/w DVT PPx. Rest as above.
Seen and examined by me this morning. Doing well post-op. Advised no need for Abx given lack of symptoms despite positive UA. Rest as above.

## 2017-12-22 NOTE — DIETITIAN INITIAL EVALUATION ADULT. - PERTINENT MEDS FT
aspirin, ancef, cipro, LR, dulcolax, colace, lactobacillus, mag hydroxide, morphine, oxycodone, miralax, senna, lipitor, Oscal+ vit D, hydrochlorothiazide, zofran, reglan, protonix

## 2017-12-23 LAB
CULTURE RESULTS: NO GROWTH — SIGNIFICANT CHANGE UP
SPECIMEN SOURCE: SIGNIFICANT CHANGE UP
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
## 2017-12-28 DIAGNOSIS — M19.90 UNSPECIFIED OSTEOARTHRITIS, UNSPECIFIED SITE: ICD-10-CM

## 2017-12-28 DIAGNOSIS — S70.02XA CONTUSION OF LEFT HIP, INITIAL ENCOUNTER: ICD-10-CM

## 2017-12-28 DIAGNOSIS — B96.20 UNSPECIFIED ESCHERICHIA COLI [E. COLI] AS THE CAUSE OF DISEASES CLASSIFIED ELSEWHERE: ICD-10-CM

## 2017-12-28 DIAGNOSIS — K21.9 GASTRO-ESOPHAGEAL REFLUX DISEASE WITHOUT ESOPHAGITIS: ICD-10-CM

## 2017-12-28 DIAGNOSIS — E87.6 HYPOKALEMIA: ICD-10-CM

## 2017-12-28 DIAGNOSIS — N39.0 URINARY TRACT INFECTION, SITE NOT SPECIFIED: ICD-10-CM

## 2017-12-28 DIAGNOSIS — T84.021A DISLOCATION OF INTERNAL LEFT HIP PROSTHESIS, INITIAL ENCOUNTER: ICD-10-CM

## 2017-12-28 DIAGNOSIS — J45.909 UNSPECIFIED ASTHMA, UNCOMPLICATED: ICD-10-CM

## 2017-12-28 DIAGNOSIS — M83.8 OTHER ADULT OSTEOMALACIA: ICD-10-CM

## 2017-12-28 DIAGNOSIS — I10 ESSENTIAL (PRIMARY) HYPERTENSION: ICD-10-CM

## 2018-06-13 ENCOUNTER — OUTPATIENT (OUTPATIENT)
Dept: OUTPATIENT SERVICES | Facility: HOSPITAL | Age: 73
LOS: 1 days | End: 2018-06-13

## 2018-06-13 DIAGNOSIS — Z98.890 OTHER SPECIFIED POSTPROCEDURAL STATES: Chronic | ICD-10-CM

## 2018-06-13 DIAGNOSIS — Z87.2 PERSONAL HISTORY OF DISEASES OF THE SKIN AND SUBCUTANEOUS TISSUE: Chronic | ICD-10-CM

## 2018-06-13 DIAGNOSIS — Z22.321 CARRIER OR SUSPECTED CARRIER OF METHICILLIN SUSCEPTIBLE STAPHYLOCOCCUS AUREUS: ICD-10-CM

## 2018-06-13 DIAGNOSIS — Z96.641 PRESENCE OF RIGHT ARTIFICIAL HIP JOINT: Chronic | ICD-10-CM

## 2018-06-13 DIAGNOSIS — Z90.89 ACQUIRED ABSENCE OF OTHER ORGANS: Chronic | ICD-10-CM

## 2018-06-26 VITALS
OXYGEN SATURATION: 100 % | HEIGHT: 64 IN | WEIGHT: 178.79 LBS | SYSTOLIC BLOOD PRESSURE: 157 MMHG | TEMPERATURE: 99 F | RESPIRATION RATE: 18 BRPM | HEART RATE: 47 BPM | DIASTOLIC BLOOD PRESSURE: 70 MMHG

## 2018-06-26 NOTE — H&P ADULT - NSHPPHYSICALEXAM_GEN_ALL_CORE
MSK: + decreased ROM secondary to pain, left knee       Remainder of exam per medical clearance note

## 2018-06-26 NOTE — H&P ADULT - PROBLEM SELECTOR PLAN 1
Admit to Orthopaedic Service.  Presents today for elective left TKR  Pt medically stable and cleared for procedure today by Dr. Park

## 2018-06-26 NOTE — H&P ADULT - NSHPLABSRESULTS_GEN_ALL_CORE
Preop CBC, BMP, PT/PTT/INR, UA - WNL per medical clearance   Low iron  Preop EKG - sinus vincent - WNL per medical clearance   nasal swab negative Preop CBC, BMP, PT/PTT/INR, UA - WNL per medical clearance   Low iron  Preop EKG - sinus vincent - WNL per medical clearance   nasal swab negative  stress echo 2015 low likelihood of CAd  holter  monitor 2015 wnl per medical clearance

## 2018-06-26 NOTE — H&P ADULT - HISTORY OF PRESENT ILLNESS
73F c/o left knee pain x       Present for elective left total knee replacement 73F c/o left knee pain x 3y. Pt. denies any accident or injury to left knee. Pt. has had 6 injections in left knee which provided minimal relief. Pt. c/o pain with up and down stairs or anything with a slope. Pt. takes Meloxicam for pain relief. Denies any numbness/tingling in b/l les.   Present for elective left total knee replacement.

## 2018-06-26 NOTE — PATIENT PROFILE ADULT. - PSH
H/O discectomy  lumbar 9/2012  H/O laminectomy    H/O pilonidal cyst  removed  History of hip replacement, total, right  2014  History of tonsillectomy H/O discectomy  lumbar 9/2012  H/O laminectomy    H/O pilonidal cyst  removed  History of hip replacement, total, right  2014  History of hip surgery  left hip 2017  History of hip surgery  right surgery 2014  History of tonsillectomy

## 2018-06-27 ENCOUNTER — RESULT REVIEW (OUTPATIENT)
Age: 73
End: 2018-06-27

## 2018-06-27 ENCOUNTER — INPATIENT (INPATIENT)
Facility: HOSPITAL | Age: 73
LOS: 1 days | Discharge: HOME HEALTH | DRG: 470 | End: 2018-06-29
Payer: MEDICARE

## 2018-06-27 DIAGNOSIS — Z98.890 OTHER SPECIFIED POSTPROCEDURAL STATES: Chronic | ICD-10-CM

## 2018-06-27 DIAGNOSIS — K21.9 GASTRO-ESOPHAGEAL REFLUX DISEASE WITHOUT ESOPHAGITIS: ICD-10-CM

## 2018-06-27 DIAGNOSIS — J45.909 UNSPECIFIED ASTHMA, UNCOMPLICATED: ICD-10-CM

## 2018-06-27 DIAGNOSIS — Z90.89 ACQUIRED ABSENCE OF OTHER ORGANS: Chronic | ICD-10-CM

## 2018-06-27 DIAGNOSIS — I10 ESSENTIAL (PRIMARY) HYPERTENSION: ICD-10-CM

## 2018-06-27 DIAGNOSIS — Z87.2 PERSONAL HISTORY OF DISEASES OF THE SKIN AND SUBCUTANEOUS TISSUE: Chronic | ICD-10-CM

## 2018-06-27 DIAGNOSIS — E78.5 HYPERLIPIDEMIA, UNSPECIFIED: ICD-10-CM

## 2018-06-27 DIAGNOSIS — Z96.641 PRESENCE OF RIGHT ARTIFICIAL HIP JOINT: Chronic | ICD-10-CM

## 2018-06-27 DIAGNOSIS — M81.0 AGE-RELATED OSTEOPOROSIS WITHOUT CURRENT PATHOLOGICAL FRACTURE: ICD-10-CM

## 2018-06-27 DIAGNOSIS — M19.90 UNSPECIFIED OSTEOARTHRITIS, UNSPECIFIED SITE: ICD-10-CM

## 2018-06-27 PROCEDURE — 73560 X-RAY EXAM OF KNEE 1 OR 2: CPT | Mod: 26,LT

## 2018-06-27 RX ORDER — ONDANSETRON 8 MG/1
4 TABLET, FILM COATED ORAL EVERY 6 HOURS
Qty: 0 | Refills: 0 | Status: DISCONTINUED | OUTPATIENT
Start: 2018-06-27 | End: 2018-06-29

## 2018-06-27 RX ORDER — SODIUM CHLORIDE 9 MG/ML
1000 INJECTION, SOLUTION INTRAVENOUS
Qty: 0 | Refills: 0 | Status: DISCONTINUED | OUTPATIENT
Start: 2018-06-27 | End: 2018-06-29

## 2018-06-27 RX ORDER — ASPIRIN/CALCIUM CARB/MAGNESIUM 324 MG
325 TABLET ORAL DAILY
Qty: 0 | Refills: 0 | Status: DISCONTINUED | OUTPATIENT
Start: 2018-06-27 | End: 2018-06-29

## 2018-06-27 RX ORDER — HYDROCHLOROTHIAZIDE 25 MG
25 TABLET ORAL DAILY
Qty: 0 | Refills: 0 | Status: DISCONTINUED | OUTPATIENT
Start: 2018-06-27 | End: 2018-06-29

## 2018-06-27 RX ORDER — BUPIVACAINE 13.3 MG/ML
20 INJECTION, SUSPENSION, LIPOSOMAL INFILTRATION ONCE
Qty: 0 | Refills: 0 | Status: DISCONTINUED | OUTPATIENT
Start: 2018-06-27 | End: 2018-06-29

## 2018-06-27 RX ORDER — ESOMEPRAZOLE MAGNESIUM 40 MG/1
1 CAPSULE, DELAYED RELEASE ORAL
Qty: 0 | Refills: 0 | COMMUNITY

## 2018-06-27 RX ORDER — HYDROMORPHONE HYDROCHLORIDE 2 MG/ML
0.5 INJECTION INTRAMUSCULAR; INTRAVENOUS; SUBCUTANEOUS ONCE
Qty: 0 | Refills: 0 | Status: DISCONTINUED | OUTPATIENT
Start: 2018-06-27 | End: 2018-06-27

## 2018-06-27 RX ORDER — ATORVASTATIN CALCIUM 80 MG/1
20 TABLET, FILM COATED ORAL AT BEDTIME
Qty: 0 | Refills: 0 | Status: DISCONTINUED | OUTPATIENT
Start: 2018-06-27 | End: 2018-06-29

## 2018-06-27 RX ORDER — ATORVASTATIN CALCIUM 80 MG/1
1 TABLET, FILM COATED ORAL
Qty: 0 | Refills: 0 | COMMUNITY

## 2018-06-27 RX ORDER — PANTOPRAZOLE SODIUM 20 MG/1
20 TABLET, DELAYED RELEASE ORAL
Qty: 0 | Refills: 0 | Status: DISCONTINUED | OUTPATIENT
Start: 2018-06-27 | End: 2018-06-29

## 2018-06-27 RX ORDER — OXYCODONE AND ACETAMINOPHEN 5; 325 MG/1; MG/1
1 TABLET ORAL EVERY 4 HOURS
Qty: 0 | Refills: 0 | Status: DISCONTINUED | OUTPATIENT
Start: 2018-06-27 | End: 2018-06-28

## 2018-06-27 RX ORDER — CHOLECALCIFEROL (VITAMIN D3) 125 MCG
2000 CAPSULE ORAL
Qty: 0 | Refills: 0 | Status: DISCONTINUED | OUTPATIENT
Start: 2018-06-27 | End: 2018-06-29

## 2018-06-27 RX ORDER — CEFAZOLIN SODIUM 1 G
2000 VIAL (EA) INJECTION EVERY 8 HOURS
Qty: 0 | Refills: 0 | Status: DISCONTINUED | OUTPATIENT
Start: 2018-06-27 | End: 2018-06-29

## 2018-06-27 RX ORDER — MORPHINE SULFATE 50 MG/1
4 CAPSULE, EXTENDED RELEASE ORAL
Qty: 0 | Refills: 0 | Status: DISCONTINUED | OUTPATIENT
Start: 2018-06-27 | End: 2018-06-29

## 2018-06-27 RX ADMIN — OXYCODONE AND ACETAMINOPHEN 1 TABLET(S): 5; 325 TABLET ORAL at 22:21

## 2018-06-27 RX ADMIN — Medication 100 MILLIGRAM(S): at 22:13

## 2018-06-27 RX ADMIN — HYDROMORPHONE HYDROCHLORIDE 0.5 MILLIGRAM(S): 2 INJECTION INTRAMUSCULAR; INTRAVENOUS; SUBCUTANEOUS at 10:42

## 2018-06-27 RX ADMIN — MORPHINE SULFATE 4 MILLIGRAM(S): 50 CAPSULE, EXTENDED RELEASE ORAL at 10:09

## 2018-06-27 RX ADMIN — Medication 30 MILLILITER(S): at 22:47

## 2018-06-27 RX ADMIN — ONDANSETRON 4 MILLIGRAM(S): 8 TABLET, FILM COATED ORAL at 14:20

## 2018-06-27 RX ADMIN — Medication 5 MILLIGRAM(S): at 22:11

## 2018-06-27 RX ADMIN — ATORVASTATIN CALCIUM 20 MILLIGRAM(S): 80 TABLET, FILM COATED ORAL at 22:11

## 2018-06-27 RX ADMIN — MORPHINE SULFATE 4 MILLIGRAM(S): 50 CAPSULE, EXTENDED RELEASE ORAL at 09:42

## 2018-06-27 NOTE — PROGRESS NOTE ADULT - SUBJECTIVE AND OBJECTIVE BOX
Orthopedics Post Op Check    Procedure: LEFT TKR  Surgeon: BRYANNA Chino. stable, laying comfortably in bed. Denies CP, SOB, N/V, numbness/tingling in b/l les.     Vital Signs Last 24 Hrs  T(C): 35.7 (27 Jun 2018 12:47), Max: 36.2 (27 Jun 2018 09:32)  T(F): 96.3 (27 Jun 2018 12:47), Max: 97.2 (27 Jun 2018 09:32)  HR: 34 (27 Jun 2018 12:47) (34 - 60)  BP: 112/59 (27 Jun 2018 12:47) (112/59 - 155/76)  BP(mean): 81 (27 Jun 2018 12:47) (81 - 109)  RR: 9 (27 Jun 2018 12:47) (8 - 18)  SpO2: 99% (27 Jun 2018 12:47) (94% - 100%)      Dressing C/D/I    Pulses: DP/PT 2+B/L LES  SLT:  intact B/L LES  Motor:  EHL/FHL/TA/GS 5/5 b/l les             Post op XR:    A/P: 73yoFemale POD#0 s/p left tkr  - Stable  - Pain Control  - DVT ppx: asa  - Post op abx: ancef  - PT, WBS: wbat b/l les   - F/U AM Labs Orthopedics Post Op Check    Procedure: LEFT TKR  Surgeon: BRYANNA Chino. stable, laying comfortably in bed. Denies CP, SOB, N/V, numbness/tingling in b/l les.     Vital Signs Last 24 Hrs  T(C): 35.7 (27 Jun 2018 12:47), Max: 36.2 (27 Jun 2018 09:32)  T(F): 96.3 (27 Jun 2018 12:47), Max: 97.2 (27 Jun 2018 09:32)  HR: 34 (27 Jun 2018 12:47) (34 - 60)  BP: 112/59 (27 Jun 2018 12:47) (112/59 - 155/76)  BP(mean): 81 (27 Jun 2018 12:47) (81 - 109)  RR: 9 (27 Jun 2018 12:47) (8 - 18)  SpO2: 99% (27 Jun 2018 12:47) (94% - 100%)      Dressing C/D/I    Pulses: DP/PT 2+B/L LES  SLT:  intact B/L LES  Motor:  EHL/FHL/TA/GS 5/5 b/l les       Post op XR: prosthesis in place     A/P: 73yoFemale POD#0 s/p left tkr  - Stable  - Pain Control  - DVT ppx: asa  - Post op abx: ancef  - PT, WBS: wbat b/l les   - F/U AM Labs Orthopedics Post Op Check    Procedure: LEFT TKR  Surgeon: BRYANNA Chino. stable, laying comfortably in bed. Denies CP, SOB, N/V, numbness/tingling in b/l les.     Vital Signs Last 24 Hrs  T(C): 35.7 (27 Jun 2018 12:47), Max: 36.2 (27 Jun 2018 09:32)  T(F): 96.3 (27 Jun 2018 12:47), Max: 97.2 (27 Jun 2018 09:32)  HR: 34 (27 Jun 2018 12:47) (34 - 60)  BP: 112/59 (27 Jun 2018 12:47) (112/59 - 155/76)  BP(mean): 81 (27 Jun 2018 12:47) (81 - 109)  RR: 9 (27 Jun 2018 12:47) (8 - 18)  SpO2: 99% (27 Jun 2018 12:47) (94% - 100%)      Dressing C/D/I    Pulses: DP/PT 2+B/L LES  SLT:  intact B/L LES  Motor:  EHL/FHL/TA/GS 5/5 b/l les       Post op XR: prosthesis in place     A/P: 73yoFemale POD#0 s/p left tkr  - Stable  - Pain Control  - DVT ppx: asa  - Post op abx: ancef   - PT, WBS: wbat b/l les   - F/U AM Labs

## 2018-06-28 ENCOUNTER — TRANSCRIPTION ENCOUNTER (OUTPATIENT)
Age: 73
End: 2018-06-28

## 2018-06-28 DIAGNOSIS — D50.0 IRON DEFICIENCY ANEMIA SECONDARY TO BLOOD LOSS (CHRONIC): ICD-10-CM

## 2018-06-28 DIAGNOSIS — Z98.890 OTHER SPECIFIED POSTPROCEDURAL STATES: ICD-10-CM

## 2018-06-28 DIAGNOSIS — K57.92 DIVERTICULITIS OF INTESTINE, PART UNSPECIFIED, WITHOUT PERFORATION OR ABSCESS WITHOUT BLEEDING: ICD-10-CM

## 2018-06-28 LAB
ANION GAP SERPL CALC-SCNC: 10 MMOL/L — SIGNIFICANT CHANGE UP (ref 5–17)
BASOPHILS NFR BLD AUTO: 0.1 % — SIGNIFICANT CHANGE UP (ref 0–2)
BUN SERPL-MCNC: 28 MG/DL — HIGH (ref 7–23)
CALCIUM SERPL-MCNC: 9.5 MG/DL — SIGNIFICANT CHANGE UP (ref 8.4–10.5)
CHLORIDE SERPL-SCNC: 98 MMOL/L — SIGNIFICANT CHANGE UP (ref 96–108)
CO2 SERPL-SCNC: 30 MMOL/L — SIGNIFICANT CHANGE UP (ref 22–31)
CREAT SERPL-MCNC: 0.79 MG/DL — SIGNIFICANT CHANGE UP (ref 0.5–1.3)
EOSINOPHIL NFR BLD AUTO: 1 % — SIGNIFICANT CHANGE UP (ref 0–6)
GLUCOSE SERPL-MCNC: 117 MG/DL — HIGH (ref 70–99)
HCT VFR BLD CALC: 33.5 % — LOW (ref 34.5–45)
HGB BLD-MCNC: 10.4 G/DL — LOW (ref 11.5–15.5)
LYMPHOCYTES # BLD AUTO: 12.3 % — LOW (ref 13–44)
MCHC RBC-ENTMCNC: 26.1 PG — LOW (ref 27–34)
MCHC RBC-ENTMCNC: 31 G/DL — LOW (ref 32–36)
MCV RBC AUTO: 84.2 FL — SIGNIFICANT CHANGE UP (ref 80–100)
MONOCYTES NFR BLD AUTO: 6.8 % — SIGNIFICANT CHANGE UP (ref 2–14)
NEUTROPHILS NFR BLD AUTO: 79.8 % — HIGH (ref 43–77)
PLATELET # BLD AUTO: 274 K/UL — SIGNIFICANT CHANGE UP (ref 150–400)
POTASSIUM SERPL-MCNC: 3.7 MMOL/L — SIGNIFICANT CHANGE UP (ref 3.5–5.3)
POTASSIUM SERPL-SCNC: 3.7 MMOL/L — SIGNIFICANT CHANGE UP (ref 3.5–5.3)
RBC # BLD: 3.98 M/UL — SIGNIFICANT CHANGE UP (ref 3.8–5.2)
RBC # FLD: 22.5 % — HIGH (ref 10.3–16.9)
SODIUM SERPL-SCNC: 138 MMOL/L — SIGNIFICANT CHANGE UP (ref 135–145)
SURGICAL PATHOLOGY STUDY: SIGNIFICANT CHANGE UP
WBC # BLD: 8.3 K/UL — SIGNIFICANT CHANGE UP (ref 3.8–10.5)
WBC # FLD AUTO: 8.3 K/UL — SIGNIFICANT CHANGE UP (ref 3.8–10.5)

## 2018-06-28 PROCEDURE — 99233 SBSQ HOSP IP/OBS HIGH 50: CPT | Mod: GC

## 2018-06-28 RX ORDER — KETOROLAC TROMETHAMINE 30 MG/ML
15 SYRINGE (ML) INJECTION EVERY 6 HOURS
Qty: 0 | Refills: 0 | Status: COMPLETED | OUTPATIENT
Start: 2018-06-28 | End: 2018-06-30

## 2018-06-28 RX ORDER — OXYCODONE HYDROCHLORIDE 5 MG/1
5 TABLET ORAL EVERY 4 HOURS
Qty: 0 | Refills: 0 | Status: DISCONTINUED | OUTPATIENT
Start: 2018-06-28 | End: 2018-06-29

## 2018-06-28 RX ORDER — ACETAMINOPHEN 500 MG
975 TABLET ORAL EVERY 8 HOURS
Qty: 0 | Refills: 0 | Status: DISCONTINUED | OUTPATIENT
Start: 2018-06-28 | End: 2018-06-29

## 2018-06-28 RX ORDER — OXYCODONE HYDROCHLORIDE 5 MG/1
10 TABLET ORAL EVERY 4 HOURS
Qty: 0 | Refills: 0 | Status: DISCONTINUED | OUTPATIENT
Start: 2018-06-28 | End: 2018-06-29

## 2018-06-28 RX ORDER — CELECOXIB 200 MG/1
200 CAPSULE ORAL
Qty: 0 | Refills: 0 | Status: DISCONTINUED | OUTPATIENT
Start: 2018-06-28 | End: 2018-06-29

## 2018-06-28 RX ADMIN — Medication 975 MILLIGRAM(S): at 21:44

## 2018-06-28 RX ADMIN — OXYCODONE AND ACETAMINOPHEN 1 TABLET(S): 5; 325 TABLET ORAL at 09:45

## 2018-06-28 RX ADMIN — PANTOPRAZOLE SODIUM 20 MILLIGRAM(S): 20 TABLET, DELAYED RELEASE ORAL at 06:21

## 2018-06-28 RX ADMIN — Medication 25 MILLIGRAM(S): at 06:21

## 2018-06-28 RX ADMIN — CELECOXIB 200 MILLIGRAM(S): 200 CAPSULE ORAL at 18:51

## 2018-06-28 RX ADMIN — Medication 15 MILLIGRAM(S): at 16:00

## 2018-06-28 RX ADMIN — Medication 325 MILLIGRAM(S): at 15:46

## 2018-06-28 RX ADMIN — OXYCODONE HYDROCHLORIDE 10 MILLIGRAM(S): 5 TABLET ORAL at 15:52

## 2018-06-28 RX ADMIN — Medication 2000 UNIT(S): at 10:58

## 2018-06-28 RX ADMIN — OXYCODONE AND ACETAMINOPHEN 1 TABLET(S): 5; 325 TABLET ORAL at 09:15

## 2018-06-28 RX ADMIN — OXYCODONE HYDROCHLORIDE 10 MILLIGRAM(S): 5 TABLET ORAL at 22:22

## 2018-06-28 RX ADMIN — Medication 975 MILLIGRAM(S): at 16:17

## 2018-06-28 RX ADMIN — Medication 2000 UNIT(S): at 18:51

## 2018-06-28 RX ADMIN — Medication 15 MILLIGRAM(S): at 23:48

## 2018-06-28 RX ADMIN — Medication 15 MILLIGRAM(S): at 15:46

## 2018-06-28 RX ADMIN — ATORVASTATIN CALCIUM 20 MILLIGRAM(S): 80 TABLET, FILM COATED ORAL at 21:44

## 2018-06-28 RX ADMIN — Medication 975 MILLIGRAM(S): at 15:47

## 2018-06-28 RX ADMIN — Medication 30 MILLILITER(S): at 04:13

## 2018-06-28 RX ADMIN — OXYCODONE HYDROCHLORIDE 10 MILLIGRAM(S): 5 TABLET ORAL at 16:23

## 2018-06-28 RX ADMIN — Medication 5 MILLIGRAM(S): at 21:44

## 2018-06-28 NOTE — PROGRESS NOTE ADULT - PROBLEM SELECTOR PLAN 6
The patient is hemodynamically stable.  The pain is controlled.  Patient is on DVT prophylaxis.  Patient is using incentive spirometry.  Oxygen saturation is acceptable.  Advance diet as tolerated.  Advance activity as tolerated.  Monitor for ileus.  Patient is on Laxatives.  Surgical wound is stable.  continue on monitor bed.

## 2018-06-28 NOTE — PROGRESS NOTE ADULT - SUBJECTIVE AND OBJECTIVE BOX
ORTHO NOTE    [x ] Pt seen/examined at 1400  [ ] Pt without any complaints/in NAD.    [x] Pt complains of: right knee pain      ROS: [ ] Fever  [ ] Chills  [ ] CP [ ] SOB [ ] Dysnea  [ ] Palpitations [ ] Cough [ ] N/V/C/D [ ] Paresthia [ ] Other     [x ] ROS  otherwise negative    .    PHYSICAL EXAM:    Vital Signs Last 24 Hrs  T(C): 36.4 (28 Jun 2018 12:29), Max: 36.4 (27 Jun 2018 18:17)  T(F): 97.6 (28 Jun 2018 12:29), Max: 97.6 (28 Jun 2018 09:30)  HR: 69 (28 Jun 2018 15:30) (38 - 70)  BP: 144/64 (28 Jun 2018 15:30) (82/47 - 144/64)  BP(mean): 85 (27 Jun 2018 18:17) (85 - 87)  RR: 17 (28 Jun 2018 12:29) (6 - 29)  SpO2: 95% (28 Jun 2018 15:30) (93% - 100%)    I&O's Detail    27 Jun 2018 07:01  -  28 Jun 2018 07:00  --------------------------------------------------------  IN:    lactated ringers.: 1170 mL  Total IN: 1170 mL    OUT:    Voided: 800 mL  Total OUT: 800 mL    Total NET: 370 mL      28 Jun 2018 07:01  -  28 Jun 2018 17:11  --------------------------------------------------------  IN:    lactated ringers.: 650 mL  Total IN: 650 mL    OUT:    Voided: 500 mL  Total OUT: 500 mL    Total NET: 150 mL           CAPILLARY BLOOD GLUCOSE                      Neuro: AAOX3    Lungs: CTA, IS demonstrated, Spo2 wnl    CV: nsr HR 80s    ABD: soft, nontender    Ext: left knee bulky dsg cdi, L LE nvid motor 5/5    LABS                        10.4   8.3   )-----------( 274      ( 28 Jun 2018 07:32 )             33.5                                06-28    138  |  98  |  28<H>  ----------------------------<  117<H>  3.7   |  30  |  0.79    Ca    9.5      28 Jun 2018 07:32        [ ] Other Labs  [ ] None ordered            Please check or [place] when present:  •  Heart Failure:    [ ] Acute        [ ]  Acute on Chronic        [ ] Chronic         [ ] Diastolic     [ ]  Combined    •  PAULA:     [ ] ATN        [ ]  Renal medullary necrosis       [ ]  Renal cortical necrosis                  [ ] Other pathological Lesion:  •  CKD:  [ ] Stage I   [ ] Stage II  [ ] Stage III    [ ]Stage IV   [ ]  CKD V   [ ]  Other/Unspecified:    •  Abdominal Nutritional Status:   [ ] Malnutrition-See Nutrition note    [ ] Cachexia   [ ]  Other        [ ] Supplement ordered:            [ ] Morbid Obesity: BMI >=40         ASSESSMENT/PLAN:      STATUS POST: R TKA pod2  pain control adjusted  bowel regimen    CONTINUE:          [ ] PT- wbat    [ ] DVT PPX- scd, ASA bid    [ ] Pain Mgt- acetaminophen 975mg q 8, oxycodone 5-10mg q 4, celebrex 200mg bid, toradol 15mg q 6    [ ] Dispo plan- home

## 2018-06-28 NOTE — DISCHARGE NOTE ADULT - MEDICATION SUMMARY - MEDICATIONS TO TAKE
I will START or STAY ON the medications listed below when I get home from the hospital:    oxyCODONE-acetaminophen 5 mg-325 mg oral tablet  -- 1 to 2 tab(s) by mouth every 4 to 6 hours, as needed, pain MDD:6    -- Caution federal law prohibits the transfer of this drug to any person other  than the person for whom it was prescribed.  May cause drowsiness.  Alcohol may intensify this effect.  Use care when operating dangerous machinery.  This prescription cannot be refilled.  This product contains acetaminophen.  Do not use  with any other product containing acetaminophen to prevent possible liver damage.  Using more of this medication than prescribed may cause serious breathing problems.    -- Indication: For Moderate to severe pain    meloxicam 15 mg oral tablet  -- 1 tab(s) by mouth once a day   -- Do not take this drug if you are pregnant.  Obtain medical advice before taking any non-prescription drugs as some may affect the action of this medication.  Take with food or milk.    -- Indication: For Antiinflammatory/pain control    Ecotrin 325 mg oral delayed release tablet  -- 1 tab(s) by mouth 2 times a day for four weeks after surgery  -- Swallow whole.  Do not crush.  Take with food or milk.    -- Indication: For Prevent blood clots    Lipitor 20 mg oral tablet  -- 1 tab(s) by mouth once a day  -- Indication: For Hyperlipidemia    hydroCHLOROthiazide 25 mg oral tablet  -- 1 tab(s) by mouth once a day  -- Indication: For Home diuretic    docusate sodium 100 mg oral capsule  -- 1 cap(s) by mouth 3 times a day  -- Indication: For constipation    bisacodyl 10 mg rectal suppository  -- 1 suppository(ies) rectally once a day, As needed, If no bowel movement by postoperative day #2  -- Indication: For constipation    lactobacillus acidophilus oral capsule  -- 1 tablet by mouth two times daily   -- Indication: For Probiotic    NexIUM OTC 20 mg oral delayed release capsule  -- 1 cap(s) by mouth once a day  -- Indication: For Acid reflux    calcium (as carbonate)-vitamin D 250 mg-125 intl units oral tablet  -- 1 tab(s) by mouth once a day  -- Indication: For supplement

## 2018-06-28 NOTE — DISCHARGE NOTE ADULT - HOSPITAL COURSE
Admit 6/28/18  OR 6/28/18- R TKA  Periop Antibx  DVT ppx  PT   Pain mgt Admit 6/28/18  OR 6/28/18- R TKA  Periop Antibx  DVT ppx  PT   Pain mgt  tele for sinus bradycardia- nsr at discharge Admit 6/28/18  OR 6/28/18- L TKA  Periop Antibx  DVT ppx  PT   Pain mgt  tele for sinus bradycardia- nsr at discharge

## 2018-06-28 NOTE — DISCHARGE NOTE ADULT - CARE PLAN
Principal Discharge DX:	OA (osteoarthritis)  Goal:	Improved ambulation and decreased pain after L TKA  Assessment and plan of treatment:	Weight bearing as tolerated on left leg with rolling walker  No strenuous activity, heavy lifting, driving, tub bathing, or returning to work until cleared by MD.  You may shower--dressing is waterproof.  Remove dressing after post op day 7, then leave incision open to air.  Follow up with Dr. Murillo to schedule an appt within 10-14 days.  If you don't have a bowel movement by post op day 3, then take Milk of Magnesia (over the counter).  If no bowel movement by at least post op day 5, then use a Dulcolax suppository (over the counter) and/or a Fleets enema--if still no bowel movement, call your MD.  Contact your doctor if you experience: fever greater than 101.5, chills, chest pain, difficulty breathing, bleeding, redness or heat around the incision.

## 2018-06-28 NOTE — DISCHARGE NOTE ADULT - PATIENT PORTAL LINK FT
You can access the [employer] Patient Portal, offered by [facility], by registering with the following website: [email or web address]

## 2018-06-28 NOTE — DISCHARGE NOTE ADULT - PLAN OF CARE
Improved ambulation and decreased pain after L TKA Weight bearing as tolerated on left leg with rolling walker  No strenuous activity, heavy lifting, driving, tub bathing, or returning to work until cleared by MD.  You may shower--dressing is waterproof.  Remove dressing after post op day 7, then leave incision open to air.  Follow up with Dr. Murillo to schedule an appt within 10-14 days.  If you don't have a bowel movement by post op day 3, then take Milk of Magnesia (over the counter).  If no bowel movement by at least post op day 5, then use a Dulcolax suppository (over the counter) and/or a Fleets enema--if still no bowel movement, call your MD.  Contact your doctor if you experience: fever greater than 101.5, chills, chest pain, difficulty breathing, bleeding, redness or heat around the incision.

## 2018-06-28 NOTE — PROGRESS NOTE ADULT - SUBJECTIVE AND OBJECTIVE BOX
Interval Events: Reviewed  Patient seen and examined at bedside.    Patient is a 73y old  Female who presents with a chief complaint of right knee pain/ right total knee replacement on 6/27/18 (28 Jun 2018 17:15)  She's doing better than in the morning.    PAST MEDICAL & SURGICAL HISTORY:  OA (osteoarthritis)  Osteoporosis  Diverticulitis  Asthma  GERD (gastroesophageal reflux disease)  HLD (hyperlipidemia)  HTN (hypertension)  History of hip surgery: left hip 2017  History of hip surgery: right surgery 2014  H/O discectomy: lumbar 9/2012  H/O pilonidal cyst: removed  H/O laminectomy  History of hip replacement, total, right: 2014  History of tonsillectomy      MEDICATIONS:  Pulmonary:    Antimicrobials:  ceFAZolin   IVPB 2000 milliGRAM(s) IV Intermittent every 8 hours    Anticoagulants:  aspirin enteric coated 325 milliGRAM(s) Oral daily    Cardiac:  hydrochlorothiazide 25 milliGRAM(s) Oral daily      Allergies    adhesives (Rash)  No Known Drug Allergies    Intolerances        Vital Signs Last 24 Hrs  T(C): 36.7 (28 Jun 2018 22:27), Max: 36.7 (28 Jun 2018 18:18)  T(F): 98 (28 Jun 2018 22:27), Max: 98.1 (28 Jun 2018 18:18)  HR: 60 (28 Jun 2018 22:27) (44 - 72)  BP: 121/85 (28 Jun 2018 22:27) (82/47 - 144/64)  BP(mean): --  RR: 16 (28 Jun 2018 22:27) (12 - 17)  SpO2: 94% (28 Jun 2018 22:27) (93% - 100%)    06-27 @ 07:01 - 06-28 @ 07:00  --------------------------------------------------------  IN: 1170 mL / OUT: 800 mL / NET: 370 mL    06-28 @ 07:01 - 06-28 @ 23:31  --------------------------------------------------------  IN: 650 mL / OUT: 500 mL / NET: 150 mL          LABS:      CBC Full  -  ( 28 Jun 2018 07:32 )  WBC Count : 8.3 K/uL  Hemoglobin : 10.4 g/dL  Hematocrit : 33.5 %  Platelet Count - Automated : 274 K/uL  Mean Cell Volume : 84.2 fL  Mean Cell Hemoglobin : 26.1 pg  Mean Cell Hemoglobin Concentration : 31.0 g/dL  Auto Neutrophil # : x  Auto Lymphocyte # : x  Auto Monocyte # : x  Auto Eosinophil # : x  Auto Basophil # : x  Auto Neutrophil % : 79.8 %  Auto Lymphocyte % : 12.3 %  Auto Monocyte % : 6.8 %  Auto Eosinophil % : 1.0 %  Auto Basophil % : 0.1 %    06-28    138  |  98  |  28<H>  ----------------------------<  117<H>  3.7   |  30  |  0.79    Ca    9.5      28 Jun 2018 07:32                          RADIOLOGY & ADDITIONAL STUDIES (The following images were personally reviewed):  [place]:                                     No  Urine output:                       adequate  DVT prophylaxis:                 Yes  Flattus:                                  Yes  Bowel movement:              No

## 2018-06-28 NOTE — DISCHARGE NOTE ADULT - REASON FOR ADMISSION
left knee pain/ left total knee replacement on 6/27/18 right knee pain/ right total knee replacement on 6/27/18

## 2018-06-28 NOTE — DISCHARGE NOTE ADULT - CARE PROVIDER_API CALL
Saw Murillo), Orthopaedic Surgery  [address]  [address] [place] [address]  Phone: [phone]  Fax: [phone]

## 2018-06-28 NOTE — PROGRESS NOTE ADULT - SUBJECTIVE AND OBJECTIVE BOX
Orthopedics Progress Note    Procedure: LEFT TKR  Surgeon: BRYANNA Vallejo stable, laying comfortably in bed. Denies CP, SOB, N/V, numbness/tingling in b/l les.     Vital Signs Last 24 Hrs  T(C): 35.8 (28 Jun 2018 06:11), Max: 36.4 (27 Jun 2018 18:17)  T(F): 96.5 (28 Jun 2018 06:11), Max: 97.5 (27 Jun 2018 18:17)  HR: 57 (28 Jun 2018 06:11) (34 - 72)  BP: 127/66 (28 Jun 2018 06:11) (112/59 - 155/76)  BP(mean): 85 (27 Jun 2018 18:17) (80 - 109)  RR: 17 (28 Jun 2018 06:11) (6 - 29)  SpO2: 100% (28 Jun 2018 06:11) (88% - 100%)      Dressing C/D/I    Pulses: DP/PT 2+B/L LES  SLT:  intact B/L LES  Motor:  EHL/FHL/TA/GS 5/5 b/l les       Post op XR: prosthesis in place     A/P: 73yoFemale POD#1 s/p left tkr  - Stable  - Pain Control  - DVT ppx: asa  - Post op abx: ancef   - PT, WBS: wbat b/l les   - F/U AM Labs

## 2018-06-29 VITALS — HEART RATE: 63 BPM | SYSTOLIC BLOOD PRESSURE: 135 MMHG | OXYGEN SATURATION: 95 % | DIASTOLIC BLOOD PRESSURE: 68 MMHG

## 2018-06-29 RX ORDER — ASPIRIN/CALCIUM CARB/MAGNESIUM 324 MG
1 TABLET ORAL
Qty: 60 | Refills: 0 | OUTPATIENT
Start: 2018-06-29 | End: 2018-07-28

## 2018-06-29 RX ORDER — MELOXICAM 15 MG/1
1 TABLET ORAL
Qty: 30 | Refills: 0 | OUTPATIENT
Start: 2018-06-29 | End: 2018-07-28

## 2018-06-29 RX ADMIN — Medication 15 MILLIGRAM(S): at 05:54

## 2018-06-29 RX ADMIN — Medication 25 MILLIGRAM(S): at 05:54

## 2018-06-29 RX ADMIN — Medication 975 MILLIGRAM(S): at 06:46

## 2018-06-29 RX ADMIN — Medication 975 MILLIGRAM(S): at 05:54

## 2018-06-29 RX ADMIN — OXYCODONE AND ACETAMINOPHEN 1 TABLET(S): 5; 325 TABLET ORAL at 06:47

## 2018-06-29 RX ADMIN — Medication 975 MILLIGRAM(S): at 13:24

## 2018-06-29 RX ADMIN — Medication 2000 UNIT(S): at 05:53

## 2018-06-29 RX ADMIN — OXYCODONE HYDROCHLORIDE 10 MILLIGRAM(S): 5 TABLET ORAL at 06:47

## 2018-06-29 RX ADMIN — CELECOXIB 200 MILLIGRAM(S): 200 CAPSULE ORAL at 05:53

## 2018-06-29 RX ADMIN — OXYCODONE HYDROCHLORIDE 10 MILLIGRAM(S): 5 TABLET ORAL at 10:15

## 2018-06-29 RX ADMIN — OXYCODONE HYDROCHLORIDE 10 MILLIGRAM(S): 5 TABLET ORAL at 09:28

## 2018-06-29 RX ADMIN — OXYCODONE HYDROCHLORIDE 10 MILLIGRAM(S): 5 TABLET ORAL at 04:07

## 2018-06-29 RX ADMIN — Medication 325 MILLIGRAM(S): at 09:28

## 2018-06-29 RX ADMIN — Medication 15 MILLIGRAM(S): at 06:46

## 2018-06-29 RX ADMIN — Medication 15 MILLIGRAM(S): at 11:37

## 2018-06-29 RX ADMIN — Medication 15 MILLIGRAM(S): at 12:00

## 2018-06-29 RX ADMIN — Medication 975 MILLIGRAM(S): at 14:00

## 2018-06-29 NOTE — PROGRESS NOTE ADULT - SUBJECTIVE AND OBJECTIVE BOX
Orthopedics Progress Note    Procedure: LEFT TKR  Surgeon: BRYANNA Vallejo stable, laying comfortably in bed. Denies CP, SOB, N/V, numbness/tingling in b/l les.     Vital Signs Last 24 Hrs  T(C): 36.4 (29 Jun 2018 05:07), Max: 36.7 (28 Jun 2018 18:18)  T(F): 97.6 (29 Jun 2018 05:07), Max: 98.1 (28 Jun 2018 18:18)  HR: 55 (29 Jun 2018 05:07) (44 - 72)  BP: 140/- (29 Jun 2018 05:07) (82/47 - 144/64)  BP(mean): --  RR: 17 (29 Jun 2018 05:07) (12 - 17)  SpO2: 97% (29 Jun 2018 05:07) (93% - 100%)      Dressing C/D/I    Pulses: DP/PT 2+B/L LES  SLT:  intact B/L LES  Motor:  EHL/FHL/TA/GS 5/5 b/l les       Post op XR: prosthesis in place     A/P: 73yoFemale POD#2 s/p left tkr  - Stable  - Pain Control  - DVT ppx: asa  - Post op abx: ancef   - PT, WBS: wbat b/l les   - F/U AM Labs

## 2018-07-02 PROCEDURE — 86900 BLOOD TYPING SEROLOGIC ABO: CPT

## 2018-07-02 PROCEDURE — 80048 BASIC METABOLIC PNL TOTAL CA: CPT

## 2018-07-02 PROCEDURE — 97116 GAIT TRAINING THERAPY: CPT

## 2018-07-02 PROCEDURE — 36415 COLL VENOUS BLD VENIPUNCTURE: CPT

## 2018-07-02 PROCEDURE — 73560 X-RAY EXAM OF KNEE 1 OR 2: CPT

## 2018-07-02 PROCEDURE — 85025 COMPLETE CBC W/AUTO DIFF WBC: CPT

## 2018-07-02 PROCEDURE — 88300 SURGICAL PATH GROSS: CPT

## 2018-07-02 PROCEDURE — 97161 PT EVAL LOW COMPLEX 20 MIN: CPT

## 2018-07-02 PROCEDURE — 86901 BLOOD TYPING SEROLOGIC RH(D): CPT

## 2018-07-02 PROCEDURE — C1776: CPT

## 2018-07-02 PROCEDURE — 97530 THERAPEUTIC ACTIVITIES: CPT

## 2018-07-02 PROCEDURE — C1713: CPT

## 2018-07-02 PROCEDURE — 86850 RBC ANTIBODY SCREEN: CPT

## 2018-07-03 DIAGNOSIS — J45.909 UNSPECIFIED ASTHMA, UNCOMPLICATED: ICD-10-CM

## 2018-07-03 DIAGNOSIS — M17.12 UNILATERAL PRIMARY OSTEOARTHRITIS, LEFT KNEE: ICD-10-CM

## 2018-07-03 DIAGNOSIS — D50.0 IRON DEFICIENCY ANEMIA SECONDARY TO BLOOD LOSS (CHRONIC): ICD-10-CM

## 2018-07-03 DIAGNOSIS — M81.0 AGE-RELATED OSTEOPOROSIS WITHOUT CURRENT PATHOLOGICAL FRACTURE: ICD-10-CM

## 2018-07-03 DIAGNOSIS — Z98.890 OTHER SPECIFIED POSTPROCEDURAL STATES: ICD-10-CM

## 2018-07-03 DIAGNOSIS — K21.9 GASTRO-ESOPHAGEAL REFLUX DISEASE WITHOUT ESOPHAGITIS: ICD-10-CM

## 2018-07-03 DIAGNOSIS — K57.90 DIVERTICULOSIS OF INTESTINE, PART UNSPECIFIED, WITHOUT PERFORATION OR ABSCESS WITHOUT BLEEDING: ICD-10-CM

## 2018-07-03 DIAGNOSIS — R00.1 BRADYCARDIA, UNSPECIFIED: ICD-10-CM

## 2018-07-03 DIAGNOSIS — I10 ESSENTIAL (PRIMARY) HYPERTENSION: ICD-10-CM

## 2018-07-03 DIAGNOSIS — Z96.641 PRESENCE OF RIGHT ARTIFICIAL HIP JOINT: ICD-10-CM

## 2019-01-07 NOTE — DISCHARGE NOTE ADULT - VISION (WITH CORRECTIVE LENSES IF THE PATIENT USUALLY WEARS THEM):
- see above  - will need to continue with basal/bolus insulin as per endocrine, given risk of developing DKA . Pt has ketosis prone DM2   - Will continue to F/U endocrine recs. Normal vision: sees adequately in most situations; can see medication labels, newsprint

## 2019-10-28 PROBLEM — Z00.00 ENCOUNTER FOR PREVENTIVE HEALTH EXAMINATION: Status: ACTIVE | Noted: 2019-10-28

## 2019-10-30 ENCOUNTER — APPOINTMENT (OUTPATIENT)
Dept: ORTHOPEDIC SURGERY | Facility: CLINIC | Age: 74
End: 2019-10-30
Payer: MEDICARE

## 2019-10-30 VITALS — HEIGHT: 65 IN | WEIGHT: 175 LBS | BODY MASS INDEX: 29.16 KG/M2 | RESPIRATION RATE: 16 BRPM

## 2019-10-30 DIAGNOSIS — Z87.891 PERSONAL HISTORY OF NICOTINE DEPENDENCE: ICD-10-CM

## 2019-10-30 DIAGNOSIS — Z86.79 PERSONAL HISTORY OF OTHER DISEASES OF THE CIRCULATORY SYSTEM: ICD-10-CM

## 2019-10-30 DIAGNOSIS — M19.031 PRIMARY OSTEOARTHRITIS, RIGHT WRIST: ICD-10-CM

## 2019-10-30 PROCEDURE — 99203 OFFICE O/P NEW LOW 30 MIN: CPT

## 2019-10-30 PROCEDURE — 73110 X-RAY EXAM OF WRIST: CPT | Mod: 50

## 2019-10-30 RX ORDER — HYDROCHLOROTHIAZIDE 12.5 MG/1
TABLET ORAL
Refills: 0 | Status: ACTIVE | COMMUNITY

## 2019-10-30 RX ORDER — ATORVASTATIN CALCIUM 80 MG/1
TABLET, FILM COATED ORAL
Refills: 0 | Status: ACTIVE | COMMUNITY

## 2019-10-31 ENCOUNTER — TRANSCRIPTION ENCOUNTER (OUTPATIENT)
Age: 74
End: 2019-10-31

## 2019-12-17 RX ORDER — OXYCODONE AND ACETAMINOPHEN 5; 325 MG/1; MG/1
5-325 TABLET ORAL
Qty: 15 | Refills: 0 | Status: ACTIVE | COMMUNITY
Start: 2019-12-17 | End: 1900-01-01

## 2019-12-18 ENCOUNTER — OUTPATIENT (OUTPATIENT)
Dept: OUTPATIENT SERVICES | Facility: HOSPITAL | Age: 74
LOS: 1 days | Discharge: HOME | End: 2019-12-18

## 2019-12-18 ENCOUNTER — APPOINTMENT (OUTPATIENT)
Dept: ORTHOPEDIC SURGERY | Facility: AMBULATORY SURGERY CENTER | Age: 74
End: 2019-12-18
Payer: MEDICARE

## 2019-12-18 DIAGNOSIS — Z98.890 OTHER SPECIFIED POSTPROCEDURAL STATES: Chronic | ICD-10-CM

## 2019-12-18 DIAGNOSIS — Z87.2 PERSONAL HISTORY OF DISEASES OF THE SKIN AND SUBCUTANEOUS TISSUE: Chronic | ICD-10-CM

## 2019-12-18 DIAGNOSIS — Z96.641 PRESENCE OF RIGHT ARTIFICIAL HIP JOINT: Chronic | ICD-10-CM

## 2019-12-18 DIAGNOSIS — Z90.89 ACQUIRED ABSENCE OF OTHER ORGANS: Chronic | ICD-10-CM

## 2019-12-18 PROCEDURE — 25999 UNLISTED PX FOREARM/WRIST: CPT | Mod: LT

## 2019-12-27 ENCOUNTER — APPOINTMENT (OUTPATIENT)
Dept: ORTHOPEDIC SURGERY | Facility: CLINIC | Age: 74
End: 2019-12-27
Payer: MEDICARE

## 2019-12-27 PROCEDURE — 73110 X-RAY EXAM OF WRIST: CPT | Mod: LT

## 2019-12-27 PROCEDURE — 99024 POSTOP FOLLOW-UP VISIT: CPT

## 2020-01-13 NOTE — H&P ADULT - PMH
Asthma    Diverticulitis    GERD (gastroesophageal reflux disease)    HLD (hyperlipidemia)    HTN (hypertension)    OA (osteoarthritis)    Osteoporosis
.

## 2020-01-24 ENCOUNTER — APPOINTMENT (OUTPATIENT)
Dept: ORTHOPEDIC SURGERY | Facility: CLINIC | Age: 75
End: 2020-01-24
Payer: MEDICARE

## 2020-01-24 PROCEDURE — 99024 POSTOP FOLLOW-UP VISIT: CPT

## 2020-01-24 PROCEDURE — 73110 X-RAY EXAM OF WRIST: CPT | Mod: LT

## 2020-03-10 PROBLEM — M19.032 CMC DJD(CARPOMETACARPAL DEGENERATIVE JOINT DISEASE), LOCALIZED PRIMARY, LEFT: Status: ACTIVE | Noted: 2019-10-30

## 2020-03-10 PROBLEM — M92.212: Status: ACTIVE | Noted: 2019-10-30

## 2020-03-13 ENCOUNTER — APPOINTMENT (OUTPATIENT)
Dept: ORTHOPEDIC SURGERY | Facility: CLINIC | Age: 75
End: 2020-03-13
Payer: MEDICARE

## 2020-03-13 VITALS — BODY MASS INDEX: 29.16 KG/M2 | HEIGHT: 65 IN | RESPIRATION RATE: 16 BRPM | WEIGHT: 175 LBS

## 2020-03-13 DIAGNOSIS — M24.132 OTHER ARTICULAR CARTILAGE DISORDERS, LEFT WRIST: ICD-10-CM

## 2020-03-13 DIAGNOSIS — M19.032 PRIMARY OSTEOARTHRITIS, LEFT WRIST: ICD-10-CM

## 2020-03-13 DIAGNOSIS — M92.212 OSTEOCHONDROSIS (JUVENILE) OF CARPAL LUNATE [KIENBOCK], LEFT HAND: ICD-10-CM

## 2020-03-13 PROCEDURE — 73110 X-RAY EXAM OF WRIST: CPT | Mod: LT

## 2020-03-13 PROCEDURE — 99024 POSTOP FOLLOW-UP VISIT: CPT

## 2020-10-01 PROBLEM — M19.031 LOCALIZED PRIMARY OSTEOARTHROSIS OF CARPOMETACARPAL JOINT OF RIGHT WRIST: Status: ACTIVE | Noted: 2019-10-30

## 2022-01-22 NOTE — DISCHARGE NOTE ADULT - CLICK TO LAUNCH ORM
Will forward refill for ozempic to Dr. Lupe Moreno as patient is no longer under Dr. Mustafa Self care.
.

## 2022-05-24 ENCOUNTER — NON-APPOINTMENT (OUTPATIENT)
Age: 77
End: 2022-05-24

## 2022-07-09 NOTE — PROGRESS NOTE ADULT - MINUTES
Pt arrives w/ sternal pain, reports concern for GERD but also had recent verucose vein procedure and verbalises concern for cardiac background.    
35

## 2023-03-02 NOTE — ASU PATIENT PROFILE, ADULT - MEDICATION HERBAL REMEDIES, PROFILE
no Render Risk Assessment In Note?: no Detail Level: Simple Additional Notes: - Recommended gentle cleansers of CeraVe hydrating cleansers twice daily.\\n- Continue applying CeraVe lotions once daily.\\n-If flaring, pt may use TMC 0.025% cream BID x 7 days once per month.

## 2023-11-01 ENCOUNTER — NON-APPOINTMENT (OUTPATIENT)
Age: 78
End: 2023-11-01

## 2024-05-05 ENCOUNTER — NON-APPOINTMENT (OUTPATIENT)
Age: 79
End: 2024-05-05

## 2024-05-23 NOTE — ED ADULT NURSE NOTE - INTEGUMENTARY WDL
Biopsy Wound Care Instructions    Keep the bandage in place for 24 hours.   Cleanse the wound with mild soapy water daily  Gently dry the area.  Apply Vaseline or petroleum jelly to the wound using a cotton tipped applicator.  Cover with a clean bandage.  Repeat this process until the biopsy site is healed.  If you had stitches placed, continue treating the site until the stitches are removed. Remember to make an appointment to return to have your stitches removed by our staff.  You may shower and bathe as usual.       ** Biopsy results generally take around 7 business days to come back.  If you have not heard from us by then, please call the office at [phone].    *Please note that biopsy results are released to both the patient and physician at the same time in [facility].  Please allow time for your physician to review the results.  One of our staff members will reach out to you with the results and plan.    Color consistent with ethnicity/race, warm, dry intact, resilient.

## 2025-01-16 ENCOUNTER — NON-APPOINTMENT (OUTPATIENT)
Age: 80
End: 2025-01-16

## 2025-01-23 ENCOUNTER — NON-APPOINTMENT (OUTPATIENT)
Age: 80
End: 2025-01-23

## 2025-02-05 NOTE — ED ADULT NURSE NOTE - HEIGHT IN INCHES
5 Patient complaining of epigastric abdominal pain associated with nausea since February 1.  Patient relates she had been on Mounjaro for 1 month increased her dose on Saturday and later that day he developed symptoms.  Patient denies fevers chills chest pain short of breath cough vomiting diarrhea urinary complaints.  PMD Hilda

## 2025-05-27 NOTE — PHYSICAL THERAPY INITIAL EVALUATION ADULT - WEIGHT-BEARING RESTRICTIONS: GAIT, REHAB EVAL
Refill Request for medication(s): SPIRONOLACTONE 100 MG Oral Tab     Last Office Visit: 08/20/24    Last Refill: 02/18/25    Pharmacy, Dosage verified: [id number]  [place], [place] - [address] AT [facility], [phone], [phone]    Condition Update (if applicable):     Rx pended and sent to provider for approval, please advise. Thank You!       weight-bearing as tolerated/LLE